# Patient Record
Sex: FEMALE | Race: WHITE | NOT HISPANIC OR LATINO | Employment: OTHER | URBAN - METROPOLITAN AREA
[De-identification: names, ages, dates, MRNs, and addresses within clinical notes are randomized per-mention and may not be internally consistent; named-entity substitution may affect disease eponyms.]

---

## 2018-02-10 ENCOUNTER — OFFICE VISIT (OUTPATIENT)
Dept: URGENT CARE | Facility: CLINIC | Age: 73
End: 2018-02-10
Payer: COMMERCIAL

## 2018-02-10 VITALS
DIASTOLIC BLOOD PRESSURE: 80 MMHG | RESPIRATION RATE: 18 BRPM | OXYGEN SATURATION: 95 % | HEART RATE: 61 BPM | SYSTOLIC BLOOD PRESSURE: 100 MMHG | TEMPERATURE: 97.1 F

## 2018-02-10 DIAGNOSIS — J40 BRONCHITIS: Primary | ICD-10-CM

## 2018-02-10 PROCEDURE — S9088 SERVICES PROVIDED IN URGENT: HCPCS | Performed by: FAMILY MEDICINE

## 2018-02-10 PROCEDURE — 99203 OFFICE O/P NEW LOW 30 MIN: CPT | Performed by: FAMILY MEDICINE

## 2018-02-10 RX ORDER — AZITHROMYCIN 250 MG/1
TABLET, FILM COATED ORAL
Qty: 6 TABLET | Refills: 0 | Status: SHIPPED | OUTPATIENT
Start: 2018-02-10 | End: 2018-02-14

## 2018-02-10 NOTE — PATIENT INSTRUCTIONS
Acute Bronchitis   WHAT YOU NEED TO KNOW:   Acute bronchitis is swelling and irritation in the air passages of your lungs  This irritation may cause you to cough or have other breathing problems  Acute bronchitis often starts because of another illness, such as a cold or the flu  The illness spreads from your nose and throat to your windpipe and airways  Bronchitis is often called a chest cold  Acute bronchitis lasts about 3 to 6 weeks and is usually not a serious illness  Your cough can last for several weeks  DISCHARGE INSTRUCTIONS:   Return to the emergency department if:   · You cough up blood  · Your lips or fingernails turn blue  · You feel like you are not getting enough air when you breathe  Contact your healthcare provider if:   · You have a fever  · Your breathing problems do not go away or get worse  · Your cough does not get better within 4 weeks  · You have questions or concerns about your condition or care  Self-care:   · Get more rest   Rest helps your body to heal  Slowly start to do more each day  Rest when you feel it is needed  · Avoid irritants in the air  Avoid chemicals, fumes, and dust  Wear a face mask if you must work around dust or fumes  Stay inside on days when air pollution levels are high  If you have allergies, stay inside when pollen counts are high  Do not use aerosol products, such as spray-on deodorant, bug spray, and hair spray  · Do not smoke or be around others who smoke  Nicotine and other chemicals in cigarettes and cigars damages the cilia that move mucus out of your lungs  Ask your healthcare provider for information if you currently smoke and need help to quit  E-cigarettes or smokeless tobacco still contain nicotine  Talk to your healthcare provider before you use these products  · Drink liquids as directed  Liquids help keep your air passages moist and help you cough up mucus   You may need to drink more liquids when you have acute bronchitis  Ask how much liquid to drink each day and which liquids are best for you  · Use a humidifier or vaporizer  Use a cool mist humidifier or a vaporizer to increase air moisture in your home  This may make it easier for you to breathe and help decrease your cough  Decrease risk for acute bronchitis:   · Get the vaccinations you need  Ask your healthcare provider if you should get vaccinated against the flu or pneumonia  · Prevent the spread of germs  You can decrease your risk of acute bronchitis and other illnesses by doing the following:     [facility] [facility] your hands often with soap and water  Carry germ-killing hand lotion or gel with you  You can use the lotion or gel to clean your hands when soap and water are not available  ¨ Do not touch your eyes, nose, or mouth unless you have washed your hands first     ¨ Always cover your mouth when you cough to prevent the spread of germs  It is best to cough into a tissue or your shirt sleeve instead of into your hand  Ask those around you cover their mouths when they cough  ¨ Try to avoid people who have a cold or the flu  If you are sick, stay away from others as much as possible  Medicines: Your healthcare provider may  give you any of the following:  · Ibuprofen or acetaminophen  are medicines that help lower your fever  They are available without a doctor's order  Ask your healthcare provider which medicine is right for you  Ask how much to take and how often to take it  Follow directions  These medicines can cause stomach bleeding if not taken correctly  Ibuprofen can cause kidney damage  Do not take ibuprofen if you have kidney disease, an ulcer, or allergies to aspirin  Acetaminophen can cause liver damage  Do not take more than 4,000 milligrams in 24 hours  · Decongestants  help loosen mucus in your lungs and make it easier to cough up  This can help you breathe easier  · Cough suppressants  decrease your urge to cough   If your cough produces mucus, do not take a cough suppressant unless your healthcare provider tells you to  Your healthcare provider may suggest that you take a cough suppressant at night so you can rest     · Inhalers  may be given  Your healthcare provider may give you one or more inhalers to help you breathe easier and cough less  An inhaler gives your medicine to open your airways  Ask your healthcare provider to show you how to use your inhaler correctly  · Take your medicine as directed  Contact your healthcare provider if you think your medicine is not helping or if you have side effects  Tell him of her if you are allergic to any medicine  Keep a list of the medicines, vitamins, and herbs you take  Include the amounts, and when and why you take them  Bring the list or the pill bottles to follow-up visits  Carry your medicine list with you in case of an emergency  Follow up with your healthcare provider as directed:  Write down questions you have so you will remember to ask them during your follow-up visits  © 2017 [employer] Information is for End User's use only and may not be sold, redistributed or otherwise used for commercial purposes  All illustrations and images included in CareNotes® are the copyrighted property of [employer]  or Lazaro Collier  The above information is an  only  It is not intended as medical advice for individual conditions or treatments  Talk to your doctor, nurse or pharmacist before following any medical regimen to see if it is safe and effective for you

## 2018-02-10 NOTE — PROGRESS NOTES
OFFICE VISIT  Brigitte Peguero 71 y o  female MRN: [id number]      Assessment / Plan:  Diagnoses and all orders for this visit:    Bronchitis  -     azithromycin (ZITHROMAX) 250 mg tablet; 2 pills today, then one daily for 4 more days          Bronchitis [J40]  1  Bronchitis  azithromycin (ZITHROMAX) 250 mg tablet     Discussion:  Patient was encouraged to keep a list of all of her ongoing medications in her purse and give a copy to her girlfriend who is with her tonight  She will also be taking azithromycin and Robitussin over-the-counter  She asked for something with codeine and however this was declined and explained to her that she would get dizzy or disoriented and fracture and additional hip  She is encouraged to see Dr Marley Hernandez in this coming week probably Monday or Tuesday for follow-up and reassessment of her O2 status  Reason For Visit / Chief Complaint  Chief Complaint   Patient presents with    Cough     with congestion     Wheezing    Fatigue        HPI:  Brigitte Peguero is a 71 y o  female patient presents with cough and fatigue for approximately 3 weeks  She states she has truly been coughing and wheezing for 3 weeks  She does not know any of her medications however her friend with her can glean some past medical history stating that she has had green productive sputum probably in the last 3-4 days without a temperature per se both ladies have been monitoring the her temperature initial blood pressure was 168/80 the patient in fact has had a MI 7 years ago which time she had a defibrillator due to heart failure she has done quite well since that time  She has a known history of hypertension sees Dr Luis Gonzalez as her primary care doctor  She claims she short of breath if she tries to do anything however she has had no leg edema or symptoms referable to congestive heart failure    She is allergic to go foods or medications    Historical Information   No past medical history on file  No past surgical history on file  Social History   History   Alcohol use Not on file     History   Drug use: Unknown     History   Smoking Status    Not on file   Smokeless Tobacco    Not on file     No family history on file  Meds/Allergies   No Known Allergies    Meds:    Current Outpatient Prescriptions:     azithromycin (ZITHROMAX) 250 mg tablet, 2 pills today, then one daily for 4 more days, Disp: 6 tablet, Rfl: 0      REVIEW OF SYSTEMS  Constitutional: negative  Eyes: negative  Ears, nose, mouth, throat, and face: negative  Respiratory: positive for cough  Cardiovascular: negative          Current Vitals:   Blood Pressure: 100/80 (02/10/18 1626)  Pulse: 61 (02/10/18 1626)  Temperature: (!) 97 1 °F (36 2 °C) (02/10/18 1626)  Respirations: 18 (02/10/18 1626)  SpO2: 95 % (02/10/18 1626)  /80   Pulse 61   Temp (!) 97 1 °F (36 2 °C)   Resp 18   SpO2 95%     PHYSICAL EXAM:          General Appearance:    Alert, cooperative, no apparent distress, appears stated age     Oriented x3    Head:    Normocephalic, without obvious abnormality, atraumatic   Eyes:      EOM's intact,      ZEESHAN,        conjunctiva/corneas clear,          fundi not visualized well   Ears:     Normal external ear canals     Tm right side  Normal     Tm left side    Normal     Nose:   Nares normal externally, septum midline,     mucosa normal, no drainage         Sinuses   Without   tenderness to palpation / percussion   Throat:   Lips, mucosa, and tongue normal       Anterior pharynx   normal      Posterior pharynx   normal   Neck:   Supple, symmetrical, trachea midline and moveable    Normal thyroid click present    No carotid bruits appreciated    Adenopathy in anterior cervical chain  normal    Adenopathy in posterior cervical chain   normal         Lungs:     Bilateral inspiratory and expiratory wheezes are noted at both bases    Rhonchi on inspiration is noted at the right base  Heart:    Regular rate and rhythm, S1 and S2 normal,     No S3, S4,     No murmurs, rubs                            Extremities:   Extremities normal,    atraumatic,     no cyanosis or edema         Skin:   Skin color, texture, turgor normal, no rashes or lesions                   Follow up at primary care in 2  days    Counseling / Coordination of Care  Total floor / unit time spent today 15 minutes  Greater than 50% of total time was spent with the patient and / or family counseling and / or coordination of care  Portions of the record may have been created with voice recognition software   Occasional wrong word or "sound a like" substitutions may have occurred due to the inherent limitations of voice recognition software   Read the chart carefully and recognize, using context, where substitutions have occurred

## 2018-04-25 LAB
ANION GAP SERPL CALCULATED.3IONS-SCNC: 8.1 MM/L
BASOPHILS # BLD AUTO: 0.1 X3/UL (ref 0–0.3)
BASOPHILS # BLD AUTO: 0.9 % (ref 0–2)
BUN SERPL-MCNC: 23 MG/DL (ref 7–25)
CALCIUM SERPL-MCNC: 10 MG/DL (ref 8.6–10.5)
CHLORIDE SERPL-SCNC: 105 MM/L (ref 98–107)
CO2 SERPL-SCNC: 33 MM/L (ref 21–31)
CREAT SERPL-MCNC: 0.77 MG/DL (ref 0.6–1.2)
DEPRECATED RDW RBC AUTO: 13.2 %
EGFR (HISTORICAL): > 60 GFR
EGFR AFRICAN AMERICAN (HISTORICAL): > 60 GFR
EOSINOPHIL # BLD AUTO: 0.1 X3/UL (ref 0–0.5)
EOSINOPHIL NFR BLD AUTO: 1.5 % (ref 0–5)
GLUCOSE (HISTORICAL): 99 MG/DL (ref 65–99)
HCT VFR BLD AUTO: 37.2 % (ref 37–47)
HGB BLD-MCNC: 12.6 G/DL (ref 12–16)
LYMPHOCYTES # BLD AUTO: 2.3 X3/UL (ref 1.2–4.2)
LYMPHOCYTES NFR BLD AUTO: 34.4 % (ref 20.5–51.1)
MCH RBC QN AUTO: 31.7 PG (ref 26–34)
MCHC RBC AUTO-ENTMCNC: 33.8 G/DL (ref 31–37)
MCV RBC AUTO: 93.7 FL (ref 81–99)
MONOCYTES # BLD AUTO: 0.8 X3/UL (ref 0–1)
MONOCYTES NFR BLD AUTO: 11.8 % (ref 1.7–12)
NEUTROPHILS # BLD AUTO: 3.5 X3/UL (ref 1.4–6.5)
NEUTS SEG NFR BLD AUTO: 51.4 % (ref 42.2–75.2)
OSMOLALITY, SERUM (HISTORICAL): 287 MOSM (ref 262–291)
PLATELET # BLD AUTO: 161 X3/UL (ref 130–400)
PMV BLD AUTO: 8.8 FL
POTASSIUM SERPL-SCNC: 4.1 MM/L (ref 3.5–5.5)
RBC # BLD AUTO: 3.97 X6/UL (ref 3.9–5.2)
SODIUM SERPL-SCNC: 142 MM/L (ref 134–143)
WBC # BLD AUTO: 6.7 X3/UL (ref 4.8–10.8)

## 2018-05-03 LAB — SURGICAL PATHOLOGY (HISTORICAL): NORMAL

## 2018-05-03 PROCEDURE — 88300 SURGICAL PATH GROSS: CPT | Performed by: PATHOLOGY

## 2018-05-04 ENCOUNTER — LAB REQUISITION (OUTPATIENT)
Dept: LAB | Facility: HOSPITAL | Age: 73
End: 2018-05-04
Payer: COMMERCIAL

## 2018-05-04 DIAGNOSIS — I42.9 CARDIOMYOPATHY (HCC): ICD-10-CM

## 2018-08-28 ENCOUNTER — TRANSCRIBE ORDERS (OUTPATIENT)
Dept: ADMINISTRATIVE | Facility: HOSPITAL | Age: 73
End: 2018-08-28

## 2018-08-28 ENCOUNTER — APPOINTMENT (OUTPATIENT)
Dept: LAB | Facility: HOSPITAL | Age: 73
End: 2018-08-28
Attending: INTERNAL MEDICINE
Payer: COMMERCIAL

## 2018-08-28 DIAGNOSIS — I10 HYPERTENSION, ESSENTIAL: ICD-10-CM

## 2018-08-28 DIAGNOSIS — M81.0 OSTEOPOROSIS, UNSPECIFIED OSTEOPOROSIS TYPE, UNSPECIFIED PATHOLOGICAL FRACTURE PRESENCE: ICD-10-CM

## 2018-08-28 DIAGNOSIS — I25.9 CHRONIC ISCHEMIC HEART DISEASE, UNSPECIFIED: ICD-10-CM

## 2018-08-28 DIAGNOSIS — I10 HYPERTENSION, ESSENTIAL: Primary | ICD-10-CM

## 2018-08-28 LAB
25(OH)D3 SERPL-MCNC: 33 NG/ML (ref 30–100)
ALBUMIN SERPL BCP-MCNC: 3.9 G/DL (ref 3.5–5.7)
ALP SERPL-CCNC: 116 U/L (ref 55–165)
ALT SERPL W P-5'-P-CCNC: 8 U/L (ref 7–52)
ANION GAP SERPL CALCULATED.3IONS-SCNC: 6 MMOL/L (ref 4–13)
AST SERPL W P-5'-P-CCNC: 18 U/L (ref 13–39)
BACTERIA UR QL AUTO: ABNORMAL /HPF
BASOPHILS # BLD AUTO: 0 THOUSANDS/ΜL (ref 0–0.1)
BASOPHILS NFR BLD AUTO: 1 % (ref 0–1)
BILIRUB SERPL-MCNC: 0.6 MG/DL (ref 0.2–1)
BILIRUB UR QL STRIP: NEGATIVE
BUN SERPL-MCNC: 29 MG/DL (ref 7–25)
CALCIUM SERPL-MCNC: 9.3 MG/DL (ref 8.6–10.5)
CHLORIDE SERPL-SCNC: 103 MMOL/L (ref 98–107)
CHOLEST SERPL-MCNC: 134 MG/DL (ref 0–200)
CLARITY UR: CLEAR
CO2 SERPL-SCNC: 29 MMOL/L (ref 21–31)
COLOR UR: YELLOW
CREAT SERPL-MCNC: 0.86 MG/DL (ref 0.6–1.2)
EOSINOPHIL # BLD AUTO: 0.1 THOUSAND/ΜL (ref 0–0.61)
EOSINOPHIL NFR BLD AUTO: 2 % (ref 0–6)
ERYTHROCYTE [DISTWIDTH] IN BLOOD BY AUTOMATED COUNT: 13.4 % (ref 11.6–15.1)
GFR SERPL CREATININE-BSD FRML MDRD: 68 ML/MIN/1.73SQ M
GLUCOSE P FAST SERPL-MCNC: 98 MG/DL (ref 65–99)
GLUCOSE UR STRIP-MCNC: NEGATIVE MG/DL
HCT VFR BLD AUTO: 36.6 % (ref 37–47)
HDLC SERPL-MCNC: 32 MG/DL (ref 40–60)
HGB BLD-MCNC: 12.2 G/DL (ref 11.5–15.4)
HGB UR QL STRIP.AUTO: NEGATIVE
KETONES UR STRIP-MCNC: NEGATIVE MG/DL
LDLC SERPL CALC-MCNC: 69 MG/DL (ref 0–100)
LEUKOCYTE ESTERASE UR QL STRIP: ABNORMAL
LYMPHOCYTES # BLD AUTO: 1.9 THOUSANDS/ΜL (ref 0.6–4.47)
LYMPHOCYTES NFR BLD AUTO: 35 % (ref 14–44)
MCH RBC QN AUTO: 31.3 PG (ref 26.8–34.3)
MCHC RBC AUTO-ENTMCNC: 33.5 G/DL (ref 31.4–37.4)
MCV RBC AUTO: 94 FL (ref 82–98)
MONOCYTES # BLD AUTO: 0.7 THOUSAND/ΜL (ref 0.17–1.22)
MONOCYTES NFR BLD AUTO: 14 % (ref 4–12)
NEUTROPHILS # BLD AUTO: 2.6 THOUSANDS/ΜL (ref 1.85–7.62)
NEUTS SEG NFR BLD AUTO: 49 % (ref 43–75)
NITRITE UR QL STRIP: NEGATIVE
NON-SQ EPI CELLS URNS QL MICRO: ABNORMAL /HPF
NONHDLC SERPL-MCNC: 102 MG/DL
NRBC BLD AUTO-RTO: 0 /100 WBCS
PH UR STRIP.AUTO: 7 [PH] (ref 5–8)
PLATELET # BLD AUTO: 183 THOUSANDS/UL (ref 149–390)
PMV BLD AUTO: 8.8 FL (ref 8.9–12.7)
POTASSIUM SERPL-SCNC: 4.5 MMOL/L (ref 3.5–5.5)
PROT SERPL-MCNC: 6.6 G/DL (ref 6.4–8.9)
PROT UR STRIP-MCNC: NEGATIVE MG/DL
RBC # BLD AUTO: 3.91 MILLION/UL (ref 3.81–5.12)
RBC #/AREA URNS AUTO: ABNORMAL /HPF
SODIUM SERPL-SCNC: 138 MMOL/L (ref 134–143)
SP GR UR STRIP.AUTO: 1.01 (ref 1–1.03)
TRIGL SERPL-MCNC: 166 MG/DL (ref 44–166)
UROBILINOGEN UR QL STRIP.AUTO: 0.2 E.U./DL
WBC # BLD AUTO: 5.3 THOUSAND/UL (ref 4.31–10.16)
WBC #/AREA URNS AUTO: ABNORMAL /HPF

## 2018-08-28 PROCEDURE — 81001 URINALYSIS AUTO W/SCOPE: CPT

## 2018-08-28 PROCEDURE — 85025 COMPLETE CBC W/AUTO DIFF WBC: CPT

## 2018-08-28 PROCEDURE — 82306 VITAMIN D 25 HYDROXY: CPT

## 2018-08-28 PROCEDURE — 80061 LIPID PANEL: CPT

## 2018-08-28 PROCEDURE — 80053 COMPREHEN METABOLIC PANEL: CPT

## 2018-08-28 PROCEDURE — 36415 COLL VENOUS BLD VENIPUNCTURE: CPT

## 2018-08-28 PROCEDURE — 81003 URINALYSIS AUTO W/O SCOPE: CPT

## 2018-09-01 PROBLEM — I44.7 LBBB (LEFT BUNDLE BRANCH BLOCK): Status: ACTIVE | Noted: 2018-09-01

## 2018-09-01 PROBLEM — I21.9 MI (MYOCARDIAL INFARCTION) (HCC): Status: ACTIVE | Noted: 2018-09-01

## 2018-09-01 PROBLEM — Z95.810 PRESENCE OF AUTOMATIC (IMPLANTABLE) CARDIAC DEFIBRILLATOR: Status: ACTIVE | Noted: 2018-09-01

## 2018-09-01 PROBLEM — I47.2 VENTRICULAR TACHYCARDIA (HCC): Status: ACTIVE | Noted: 2018-09-01

## 2018-09-01 PROBLEM — I50.9 CHF (CONGESTIVE HEART FAILURE) (HCC): Status: ACTIVE | Noted: 2018-09-01

## 2018-09-01 PROBLEM — E78.5 HYPERLIPIDEMIA: Status: ACTIVE | Noted: 2018-09-01

## 2018-09-01 PROBLEM — R01.1 CARDIAC MURMUR: Status: ACTIVE | Noted: 2018-09-01

## 2018-09-01 PROBLEM — I34.0 NONRHEUMATIC MITRAL VALVE REGURGITATION: Status: ACTIVE | Noted: 2018-09-01

## 2018-09-01 PROBLEM — E78.5 DYSLIPIDEMIA: Status: ACTIVE | Noted: 2018-09-01

## 2018-09-01 PROBLEM — I63.9 CVA (CEREBRAL VASCULAR ACCIDENT) (HCC): Status: ACTIVE | Noted: 2018-09-01

## 2018-09-01 PROBLEM — I25.10 CAD (CORONARY ARTERY DISEASE): Status: ACTIVE | Noted: 2018-09-01

## 2018-09-01 PROBLEM — I25.5 ISCHEMIC CARDIOMYOPATHY: Status: ACTIVE | Noted: 2018-09-01

## 2018-09-01 PROBLEM — I10 HYPERTENSION: Status: ACTIVE | Noted: 2018-09-01

## 2018-09-01 RX ORDER — ALENDRONATE SODIUM 10 MG/1
10 TABLET ORAL
COMMUNITY
End: 2019-09-16 | Stop reason: SDUPTHER

## 2018-09-01 RX ORDER — MULTIVITAMIN WITH IRON
2 TABLET ORAL DAILY
COMMUNITY

## 2018-09-01 RX ORDER — ATORVASTATIN CALCIUM 80 MG/1
80 TABLET, FILM COATED ORAL DAILY
COMMUNITY
End: 2019-11-26 | Stop reason: SDUPTHER

## 2018-09-01 RX ORDER — FLUOXETINE 20 MG/1
20 TABLET, FILM COATED ORAL DAILY
COMMUNITY
End: 2019-11-26 | Stop reason: SDUPTHER

## 2018-09-01 RX ORDER — FUROSEMIDE 20 MG/1
20 TABLET ORAL DAILY
COMMUNITY
End: 2020-04-18

## 2018-09-01 RX ORDER — NITROGLYCERIN 0.4 MG/1
0.4 TABLET SUBLINGUAL AS NEEDED
COMMUNITY
End: 2020-08-24 | Stop reason: SDUPTHER

## 2018-09-01 RX ORDER — ASPIRIN 81 MG/1
81 TABLET ORAL DAILY
COMMUNITY

## 2018-09-01 RX ORDER — LOSARTAN POTASSIUM 25 MG/1
25 TABLET ORAL DAILY
COMMUNITY
End: 2019-11-26 | Stop reason: SDUPTHER

## 2018-09-01 RX ORDER — CARVEDILOL 3.12 MG/1
3.12 TABLET ORAL 2 TIMES DAILY WITH MEALS
COMMUNITY
End: 2019-11-26 | Stop reason: SDUPTHER

## 2018-09-25 ENCOUNTER — REMOTE DEVICE CLINIC VISIT (OUTPATIENT)
Dept: CARDIOLOGY CLINIC | Facility: CLINIC | Age: 73
End: 2018-09-25
Payer: COMMERCIAL

## 2018-09-25 DIAGNOSIS — Z45.02 ENCOUNTER FOR CHECKING OF AUTOMATIC IMPLANTABLE CARDIOVERTER-DEFIBRILLATOR (AICD): ICD-10-CM

## 2018-09-25 DIAGNOSIS — I25.5 ISCHEMIC CARDIOMYOPATHY: Primary | ICD-10-CM

## 2018-09-25 PROCEDURE — 93295 DEV INTERROG REMOTE 1/2/MLT: CPT | Performed by: INTERNAL MEDICINE

## 2018-09-25 PROCEDURE — 93296 REM INTERROG EVL PM/IDS: CPT | Performed by: INTERNAL MEDICINE

## 2018-09-25 NOTE — PROGRESS NOTES
Lat[employer] remote BIV ICD 3 ATR (mode switching) events       Current Outpatient Prescriptions:     alendronate (FOSAMAX) 10 mg tablet, Take 10 mg by mouth Every day in the morning, at least 30 minutes before first food, beverage, or medication of the day , Disp: , Rfl:     aspirin (ECOTRIN LOW STRENGTH) 81 mg EC tablet, Take 81 mg by mouth daily, Disp: , Rfl:     atorvastatin (LIPITOR) 80 mg tablet, Take 80 mg by mouth daily, Disp: , Rfl:     carvedilol (COREG) 3 125 mg tablet, Take 3 125 mg by mouth 2 (two) times a day with meals, Disp: , Rfl:     FLUoxetine (PROzac) 20 MG tablet, Take 20 mg by mouth daily, Disp: , Rfl:     furosemide (LASIX) 20 mg tablet, Take 20 mg by mouth daily, Disp: , Rfl:     losartan (COZAAR) 25 mg tablet, Take 25 mg by mouth daily, Disp: , Rfl:     Magnesium 250 MG TABS, Take 2 tablets by mouth daily, Disp: , Rfl:     nitroglycerin (NITROSTAT) 0 4 mg SL tablet, Place 0 4 mg under the tongue as needed for chest pain, Disp: , Rfl:

## 2018-11-16 ENCOUNTER — OFFICE VISIT (OUTPATIENT)
Dept: CARDIOLOGY CLINIC | Facility: CLINIC | Age: 73
End: 2018-11-16
Payer: COMMERCIAL

## 2018-11-16 VITALS
SYSTOLIC BLOOD PRESSURE: 110 MMHG | HEIGHT: 61 IN | BODY MASS INDEX: 21.9 KG/M2 | DIASTOLIC BLOOD PRESSURE: 64 MMHG | WEIGHT: 116 LBS

## 2018-11-16 DIAGNOSIS — Z95.810 PRESENCE OF AUTOMATIC (IMPLANTABLE) CARDIAC DEFIBRILLATOR: ICD-10-CM

## 2018-11-16 DIAGNOSIS — E78.2 MIXED HYPERLIPIDEMIA: Primary | ICD-10-CM

## 2018-11-16 DIAGNOSIS — I10 ESSENTIAL HYPERTENSION: ICD-10-CM

## 2018-11-16 DIAGNOSIS — I25.10 CORONARY ARTERY DISEASE INVOLVING NATIVE CORONARY ARTERY OF NATIVE HEART WITHOUT ANGINA PECTORIS: ICD-10-CM

## 2018-11-16 PROBLEM — I42.0 DILATED CARDIOMYOPATHY (HCC): Status: ACTIVE | Noted: 2018-09-01

## 2018-11-16 PROCEDURE — 93000 ELECTROCARDIOGRAM COMPLETE: CPT | Performed by: INTERNAL MEDICINE

## 2018-11-16 PROCEDURE — 99214 OFFICE O/P EST MOD 30 MIN: CPT | Performed by: INTERNAL MEDICINE

## 2018-11-16 NOTE — ASSESSMENT & PLAN NOTE
2010 with cath  YAMILE to prox Cx  Left dominant  LAD OK  CAD hasn't been evaluated since then  Will check a Lexiscan study in 6 months

## 2018-11-16 NOTE — PROGRESS NOTES
Patient ID: Catrina Haji is a 68 y o  female  Plan:      Dilated cardiomyopathy (HCC)  Severe but stable symptoms  Though CAD was present, LV dysfunction seems out or proportion to this  CAD (coronary artery disease)  2010 with cath  YAMILE to prox Cx  Left dominant  LAD OK  CAD hasn't been evaluated since then  Will check a Lexiscan study in 6 months  Presence of automatic (implantable) cardiac defibrillator  Will continue surveillance  Follow up Plan:  6 month Lexiscan  1 year ecg and f/u visit  HPI:  The patient is seen in follow-up today regarding cardiomyopathy, prior biventricular ICD placement, CAD, and hyperlipidemia  No chest pain or chest pressure since her last visit  No shocks  No syncope or near syncope  Results for orders placed or performed in visit on 11/16/18   POCT ECG    Impression    Dual paced with proper capture  Other cardiac testing:  Last echo in July of 2017:  Mild LVH  Ejection fraction less than 20%  Past Surgical History:   Procedure Laterality Date    CARDIAC CATHETERIZATION  07/10/2017    High LVEDP  Severe biv failure 4+MR  YAMILE x2 to prox Cx  RCA non dominant  LAD ok   CARDIAC PACEMAKER PLACEMENT  2010    Dual chamber [employer] Biv AICD, gen change BiV ICD [employer] 5/18    CORONARY ANGIOPLASTY WITH STENT PLACEMENT  2010    stenting to circumflex     CMP:   Lab Results   Component Value Date     04/25/2018    K 4 5 08/28/2018    K 4 1 04/25/2018     08/28/2018     04/25/2018    CO2 29 08/28/2018    CO2 33 (H) 04/25/2018    BUN 29 (H) 08/28/2018    BUN 23 04/25/2018    CREATININE 0 86 08/28/2018    CREATININE 0 77 04/25/2018    EGFR 68 08/28/2018       Lipid Profile:   Lab Results   Component Value Date    TRIG 166 08/28/2018    HDL 32 (L) 08/28/2018         Review of Systems   10  point ROS  was otherwise non pertinent or negative except as per HPI or as below     Gait:  Slow but normal         Objective:     BP 110/64   Ht 5' 1" (1 549 m)   Wt 52 6 kg (116 lb)   BMI 21 92 kg/m²     PHYSICAL EXAM:    General:  Normal appearance in no distress  Eyes:  Anicteric  Oral mucosa:  Moist   Neck:  No JVD  Carotid upstrokes are brisk without bruits  No masses  Chest:  Clear to auscultation and percussion  Well-healed prior ICD implant site on the left  Cardiac:  Lateral PMI  Normal S1 and S2  No murmur gallop or rub  Abdomen:  Soft and nontender  No palpable organomegaly or aortic enlargement  Extremities:  No peripheral edema  Musculoskeletal:  Symmetric  Vascular:  Femoral pulses are brisk without bruits  Popliteal pulses are intact bilaterally  Pedal pulses are intact  Neuro:  Grossly symmetric  Psych:  Alert and oriented x3          Current Outpatient Prescriptions:     alendronate (FOSAMAX) 10 mg tablet, Take 10 mg by mouth Every day in the morning, at least 30 minutes before first food, beverage, or medication of the day , Disp: , Rfl:     aspirin (ECOTRIN LOW STRENGTH) 81 mg EC tablet, Take 81 mg by mouth daily, Disp: , Rfl:     atorvastatin (LIPITOR) 80 mg tablet, Take 80 mg by mouth daily, Disp: , Rfl:     carvedilol (COREG) 3 125 mg tablet, Take 3 125 mg by mouth 2 (two) times a day with meals, Disp: , Rfl:     FLUoxetine (PROzac) 20 MG tablet, Take 20 mg by mouth daily, Disp: , Rfl:     furosemide (LASIX) 20 mg tablet, Take 20 mg by mouth daily, Disp: , Rfl:     losartan (COZAAR) 25 mg tablet, Take 25 mg by mouth daily, Disp: , Rfl:     Magnesium 250 MG TABS, Take 2 tablets by mouth daily, Disp: , Rfl:     nitroglycerin (NITROSTAT) 0 4 mg SL tablet, Place 0 4 mg under the tongue as needed for chest pain, Disp: , Rfl:   Allergies   Allergen Reactions    Lisinopril      Past Medical History:   Diagnosis Date    Athscl heart disease of native coronary artery w/o ang pctrs     Cerebral infarction ([facility] [address] )     Essential (primary) hypertension     Hx of echocardiogram 07/31/2017    2D w/ CFD; EF0 15 (15%) mild LVH  Mild mitral and aortic regurgitation  Trace tricuspid regurgitation    / EF0 35 (35%) hypokinesis f anteroseptum, anterolateral wall, mid inferoseptum, and apex  Mildly dilated left atrium  Mild to moderate MR   Mild TR  7/11/17    Ischemic cardiomyopathy     Mixed hyperlipidemia     Nonrheumatic mitral (valve) insufficiency     Presence of automatic (implantable) cardiac defibrillator            History   Smoking Status    Former Smoker   Smokeless Tobacco    Never Used

## 2018-11-29 ENCOUNTER — APPOINTMENT (OUTPATIENT)
Dept: LAB | Facility: HOSPITAL | Age: 73
End: 2018-11-29
Attending: INTERNAL MEDICINE
Payer: COMMERCIAL

## 2018-11-29 ENCOUNTER — TRANSCRIBE ORDERS (OUTPATIENT)
Dept: ADMINISTRATIVE | Facility: HOSPITAL | Age: 73
End: 2018-11-29

## 2018-11-29 ENCOUNTER — HOSPITAL ENCOUNTER (OUTPATIENT)
Dept: RADIOLOGY | Facility: HOSPITAL | Age: 73
Discharge: HOME | End: 2018-11-29
Attending: INTERNAL MEDICINE
Payer: COMMERCIAL

## 2018-11-29 DIAGNOSIS — R05.9 COUGH: ICD-10-CM

## 2018-11-29 DIAGNOSIS — I25.9 CHRONIC ISCHEMIC HEART DISEASE, UNSPECIFIED: ICD-10-CM

## 2018-11-29 DIAGNOSIS — J20.9 ACUTE BRONCHITIS, UNSPECIFIED ORGANISM: ICD-10-CM

## 2018-11-29 DIAGNOSIS — J20.9 ACUTE BRONCHITIS, UNSPECIFIED ORGANISM: Primary | ICD-10-CM

## 2018-11-29 LAB
ALBUMIN SERPL BCP-MCNC: 3.9 G/DL (ref 3.5–5.7)
ALP SERPL-CCNC: 133 U/L (ref 55–165)
ALT SERPL W P-5'-P-CCNC: 9 U/L (ref 7–52)
ANION GAP SERPL CALCULATED.3IONS-SCNC: 8 MMOL/L (ref 4–13)
AST SERPL W P-5'-P-CCNC: 21 U/L (ref 13–39)
BACTERIA UR QL AUTO: ABNORMAL /HPF
BASOPHILS # BLD AUTO: 0.1 THOUSANDS/ΜL (ref 0–0.1)
BASOPHILS NFR BLD AUTO: 1 % (ref 0–1)
BILIRUB SERPL-MCNC: 0.5 MG/DL (ref 0.2–1)
BILIRUB UR QL STRIP: NEGATIVE
BNP SERPL-MCNC: 102 PG/ML (ref 1–100)
BUN SERPL-MCNC: 23 MG/DL (ref 7–25)
CALCIUM SERPL-MCNC: 9.4 MG/DL (ref 8.6–10.5)
CHLORIDE SERPL-SCNC: 103 MMOL/L (ref 98–107)
CLARITY UR: ABNORMAL
CO2 SERPL-SCNC: 28 MMOL/L (ref 21–31)
COLOR UR: YELLOW
CREAT SERPL-MCNC: 0.84 MG/DL (ref 0.6–1.2)
EOSINOPHIL # BLD AUTO: 0.1 THOUSAND/ΜL (ref 0–0.61)
EOSINOPHIL NFR BLD AUTO: 2 % (ref 0–6)
ERYTHROCYTE [DISTWIDTH] IN BLOOD BY AUTOMATED COUNT: 13.5 % (ref 11.6–15.1)
GFR SERPL CREATININE-BSD FRML MDRD: 70 ML/MIN/1.73SQ M
GLUCOSE SERPL-MCNC: 101 MG/DL (ref 65–140)
GLUCOSE UR STRIP-MCNC: NEGATIVE MG/DL
HCT VFR BLD AUTO: 38.9 % (ref 37–47)
HGB BLD-MCNC: 12.6 G/DL (ref 11.5–15.4)
HGB UR QL STRIP.AUTO: NEGATIVE
KETONES UR STRIP-MCNC: NEGATIVE MG/DL
LEUKOCYTE ESTERASE UR QL STRIP: ABNORMAL
LYMPHOCYTES # BLD AUTO: 2.1 THOUSANDS/ΜL (ref 0.6–4.47)
LYMPHOCYTES NFR BLD AUTO: 37 % (ref 14–44)
MCH RBC QN AUTO: 31 PG (ref 26.8–34.3)
MCHC RBC AUTO-ENTMCNC: 32.5 G/DL (ref 31.4–37.4)
MCV RBC AUTO: 95 FL (ref 82–98)
MONOCYTES # BLD AUTO: 0.6 THOUSAND/ΜL (ref 0.17–1.22)
MONOCYTES NFR BLD AUTO: 11 % (ref 4–12)
MUCOUS THREADS UR QL AUTO: ABNORMAL
NEUTROPHILS # BLD AUTO: 2.9 THOUSANDS/ΜL (ref 1.85–7.62)
NEUTS SEG NFR BLD AUTO: 49 % (ref 43–75)
NITRITE UR QL STRIP: NEGATIVE
NON-SQ EPI CELLS URNS QL MICRO: ABNORMAL /HPF
NRBC BLD AUTO-RTO: 0 /100 WBCS
OTHER STN SPEC: ABNORMAL
PH UR STRIP.AUTO: 6 [PH] (ref 5–8)
PLATELET # BLD AUTO: 169 THOUSANDS/UL (ref 149–390)
PMV BLD AUTO: 8.5 FL (ref 8.9–12.7)
POTASSIUM SERPL-SCNC: 4.6 MMOL/L (ref 3.5–5.5)
PROT SERPL-MCNC: 6.6 G/DL (ref 6.4–8.9)
PROT UR STRIP-MCNC: NEGATIVE MG/DL
RBC # BLD AUTO: 4.08 MILLION/UL (ref 3.81–5.12)
RBC #/AREA URNS AUTO: ABNORMAL /HPF
SODIUM SERPL-SCNC: 139 MMOL/L (ref 134–143)
SP GR UR STRIP.AUTO: 1.02 (ref 1–1.03)
UROBILINOGEN UR QL STRIP.AUTO: 0.2 E.U./DL
WBC # BLD AUTO: 5.8 THOUSAND/UL (ref 4.31–10.16)
WBC #/AREA URNS AUTO: ABNORMAL /HPF

## 2018-11-29 PROCEDURE — 71046 X-RAY EXAM CHEST 2 VIEWS: CPT

## 2018-11-29 PROCEDURE — 36415 COLL VENOUS BLD VENIPUNCTURE: CPT

## 2018-11-29 PROCEDURE — 80053 COMPREHEN METABOLIC PANEL: CPT

## 2018-11-29 PROCEDURE — 85025 COMPLETE CBC W/AUTO DIFF WBC: CPT

## 2018-11-29 PROCEDURE — 83880 ASSAY OF NATRIURETIC PEPTIDE: CPT

## 2018-11-29 PROCEDURE — 81001 URINALYSIS AUTO W/SCOPE: CPT | Performed by: INTERNAL MEDICINE

## 2018-12-31 ENCOUNTER — REMOTE DEVICE CLINIC VISIT (OUTPATIENT)
Dept: CARDIOLOGY CLINIC | Facility: CLINIC | Age: 73
End: 2018-12-31
Payer: COMMERCIAL

## 2018-12-31 DIAGNOSIS — Z45.02 ENCOUNTER FOR IMPLANTABLE DEFIBRILLATOR REPROGRAMMING OR CHECK: ICD-10-CM

## 2018-12-31 DIAGNOSIS — I25.5 ISCHEMIC CARDIOMYOPATHY: Primary | ICD-10-CM

## 2018-12-31 PROCEDURE — 93296 REM INTERROG EVL PM/IDS: CPT | Performed by: INTERNAL MEDICINE

## 2018-12-31 PROCEDURE — 93295 DEV INTERROG REMOTE 1/2/MLT: CPT | Performed by: INTERNAL MEDICINE

## 2018-12-31 NOTE — PROGRESS NOTES
Latitude remote biv-icd 2 ATR and 5 PMT dected  Normal battery function      Current Outpatient Prescriptions:     alendronate (FOSAMAX) 10 mg tablet, Take 10 mg by mouth Every day in the morning, at least 30 minutes before first food, beverage, or medication of the day , Disp: , Rfl:     aspirin (ECOTRIN LOW STRENGTH) 81 mg EC tablet, Take 81 mg by mouth daily, Disp: , Rfl:     atorvastatin (LIPITOR) 80 mg tablet, Take 80 mg by mouth daily, Disp: , Rfl:     carvedilol (COREG) 3 125 mg tablet, Take 3 125 mg by mouth 2 (two) times a day with meals, Disp: , Rfl:     FLUoxetine (PROzac) 20 MG tablet, Take 20 mg by mouth daily, Disp: , Rfl:     furosemide (LASIX) 20 mg tablet, Take 20 mg by mouth daily, Disp: , Rfl:     losartan (COZAAR) 25 mg tablet, Take 25 mg by mouth daily, Disp: , Rfl:     Magnesium 250 MG TABS, Take 2 tablets by mouth daily, Disp: , Rfl:     nitroglycerin (NITROSTAT) 0 4 mg SL tablet, Place 0 4 mg under the tongue as needed for chest pain, Disp: , Rfl:

## 2019-04-01 ENCOUNTER — REMOTE DEVICE CLINIC VISIT (OUTPATIENT)
Dept: CARDIOLOGY CLINIC | Facility: CLINIC | Age: 74
End: 2019-04-01
Payer: COMMERCIAL

## 2019-04-01 DIAGNOSIS — I42.0 DILATED CARDIOMYOPATHY (HCC): Primary | ICD-10-CM

## 2019-04-01 DIAGNOSIS — Z45.02 ENCOUNTER FOR IMPLANTABLE DEFIBRILLATOR REPROGRAMMING OR CHECK: ICD-10-CM

## 2019-04-01 PROCEDURE — 93296 REM INTERROG EVL PM/IDS: CPT | Performed by: INTERNAL MEDICINE

## 2019-04-01 PROCEDURE — 93295 DEV INTERROG REMOTE 1/2/MLT: CPT | Performed by: INTERNAL MEDICINE

## 2019-05-21 ENCOUNTER — HOSPITAL ENCOUNTER (OUTPATIENT)
Dept: RADIOLOGY | Facility: HOSPITAL | Age: 74
Discharge: HOME | End: 2019-05-21
Attending: INTERNAL MEDICINE
Payer: COMMERCIAL

## 2019-05-21 ENCOUNTER — TRANSCRIBE ORDERS (OUTPATIENT)
Dept: ADMINISTRATIVE | Facility: HOSPITAL | Age: 74
End: 2019-05-21

## 2019-05-21 DIAGNOSIS — M54.5 LOW BACK PAIN, UNSPECIFIED BACK PAIN LATERALITY, UNSPECIFIED CHRONICITY, WITH SCIATICA PRESENCE UNSPECIFIED: ICD-10-CM

## 2019-05-21 DIAGNOSIS — M25.552 LEFT HIP PAIN: ICD-10-CM

## 2019-05-21 DIAGNOSIS — M25.552 LEFT HIP PAIN: Primary | ICD-10-CM

## 2019-05-21 PROCEDURE — 72110 X-RAY EXAM L-2 SPINE 4/>VWS: CPT

## 2019-05-21 PROCEDURE — 73502 X-RAY EXAM HIP UNI 2-3 VIEWS: CPT

## 2019-05-23 ENCOUNTER — TELEPHONE (OUTPATIENT)
Dept: CARDIOLOGY CLINIC | Facility: CLINIC | Age: 74
End: 2019-05-23

## 2019-06-18 ENCOUNTER — APPOINTMENT (OUTPATIENT)
Dept: LAB | Facility: HOSPITAL | Age: 74
End: 2019-06-18
Attending: INTERNAL MEDICINE
Payer: COMMERCIAL

## 2019-06-18 ENCOUNTER — TRANSCRIBE ORDERS (OUTPATIENT)
Dept: ADMINISTRATIVE | Facility: HOSPITAL | Age: 74
End: 2019-06-18

## 2019-06-18 DIAGNOSIS — I10 ESSENTIAL HYPERTENSION: ICD-10-CM

## 2019-06-18 DIAGNOSIS — I25.9 CHRONIC ISCHEMIC HEART DISEASE, UNSPECIFIED: ICD-10-CM

## 2019-06-18 DIAGNOSIS — M81.0 SENILE OSTEOPOROSIS: ICD-10-CM

## 2019-06-18 DIAGNOSIS — I10 ESSENTIAL HYPERTENSION: Primary | ICD-10-CM

## 2019-06-18 LAB
25(OH)D3 SERPL-MCNC: 37.7 NG/ML (ref 30–100)
ALBUMIN SERPL BCP-MCNC: 4 G/DL (ref 3.5–5.7)
ALP SERPL-CCNC: 119 U/L (ref 55–165)
ALT SERPL W P-5'-P-CCNC: 10 U/L (ref 7–52)
ANION GAP SERPL CALCULATED.3IONS-SCNC: 8 MMOL/L (ref 4–13)
AST SERPL W P-5'-P-CCNC: 19 U/L (ref 13–39)
BACTERIA UR QL AUTO: ABNORMAL /HPF
BILIRUB SERPL-MCNC: 0.7 MG/DL (ref 0.2–1)
BILIRUB UR QL STRIP: NEGATIVE
BUN SERPL-MCNC: 23 MG/DL (ref 7–25)
CALCIUM SERPL-MCNC: 9.9 MG/DL (ref 8.6–10.5)
CHLORIDE SERPL-SCNC: 104 MMOL/L (ref 98–107)
CHOLEST SERPL-MCNC: 148 MG/DL (ref 0–200)
CLARITY UR: CLEAR
CO2 SERPL-SCNC: 27 MMOL/L (ref 21–31)
COLOR UR: YELLOW
CREAT SERPL-MCNC: 0.83 MG/DL (ref 0.6–1.2)
CREAT UR-MCNC: 105 MG/DL
ERYTHROCYTE [DISTWIDTH] IN BLOOD BY AUTOMATED COUNT: 13.7 % (ref 11.6–15.1)
GFR SERPL CREATININE-BSD FRML MDRD: 70 ML/MIN/1.73SQ M
GLUCOSE SERPL-MCNC: 92 MG/DL (ref 65–140)
GLUCOSE UR STRIP-MCNC: NEGATIVE MG/DL
HCT VFR BLD AUTO: 38 % (ref 37–47)
HDLC SERPL-MCNC: 40 MG/DL (ref 40–60)
HGB BLD-MCNC: 12.9 G/DL (ref 11.5–15.4)
HGB UR QL STRIP.AUTO: NEGATIVE
KETONES UR STRIP-MCNC: NEGATIVE MG/DL
LDLC SERPL CALC-MCNC: 87 MG/DL (ref 0–100)
LEUKOCYTE ESTERASE UR QL STRIP: NEGATIVE
MCH RBC QN AUTO: 31.5 PG (ref 26.8–34.3)
MCHC RBC AUTO-ENTMCNC: 33.9 G/DL (ref 31.4–37.4)
MCV RBC AUTO: 93 FL (ref 82–98)
MICROALBUMIN UR-MCNC: 11 MG/L (ref 0–20)
MICROALBUMIN/CREAT 24H UR: 10 MG/G CREATININE (ref 0–30)
NITRITE UR QL STRIP: NEGATIVE
NON-SQ EPI CELLS URNS QL MICRO: ABNORMAL /HPF
NONHDLC SERPL-MCNC: 108 MG/DL
PH UR STRIP.AUTO: 6 [PH]
PLATELET # BLD AUTO: 153 THOUSANDS/UL (ref 149–390)
PMV BLD AUTO: 9 FL (ref 8.9–12.7)
POTASSIUM SERPL-SCNC: 4.7 MMOL/L (ref 3.5–5.5)
PROT SERPL-MCNC: 6.6 G/DL (ref 6.4–8.9)
PROT UR STRIP-MCNC: NEGATIVE MG/DL
RBC # BLD AUTO: 4.1 MILLION/UL (ref 3.81–5.12)
RBC #/AREA URNS AUTO: ABNORMAL /HPF
SODIUM SERPL-SCNC: 139 MMOL/L (ref 134–143)
SP GR UR STRIP.AUTO: 1.02 (ref 1–1.03)
TRIGL SERPL-MCNC: 106 MG/DL (ref 44–166)
UROBILINOGEN UR QL STRIP.AUTO: 0.2 E.U./DL
WBC # BLD AUTO: 6 THOUSAND/UL (ref 4.31–10.16)
WBC #/AREA URNS AUTO: ABNORMAL /HPF

## 2019-06-18 PROCEDURE — 82043 UR ALBUMIN QUANTITATIVE: CPT | Performed by: INTERNAL MEDICINE

## 2019-06-18 PROCEDURE — 80053 COMPREHEN METABOLIC PANEL: CPT

## 2019-06-18 PROCEDURE — 81001 URINALYSIS AUTO W/SCOPE: CPT | Performed by: INTERNAL MEDICINE

## 2019-06-18 PROCEDURE — 82570 ASSAY OF URINE CREATININE: CPT | Performed by: INTERNAL MEDICINE

## 2019-06-18 PROCEDURE — 85027 COMPLETE CBC AUTOMATED: CPT

## 2019-06-18 PROCEDURE — 36415 COLL VENOUS BLD VENIPUNCTURE: CPT

## 2019-06-18 PROCEDURE — 80061 LIPID PANEL: CPT

## 2019-06-18 PROCEDURE — 82306 VITAMIN D 25 HYDROXY: CPT

## 2019-07-26 ENCOUNTER — IN-CLINIC DEVICE VISIT (OUTPATIENT)
Dept: CARDIOLOGY CLINIC | Facility: CLINIC | Age: 74
End: 2019-07-26
Payer: COMMERCIAL

## 2019-07-26 DIAGNOSIS — Z45.02 ENCOUNTER FOR IMPLANTABLE DEFIBRILLATOR REPROGRAMMING OR CHECK: ICD-10-CM

## 2019-07-26 DIAGNOSIS — I47.2 VENTRICULAR TACHYCARDIA (HCC): Primary | ICD-10-CM

## 2019-07-26 DIAGNOSIS — I42.0 DILATED CARDIOMYOPATHY (HCC): ICD-10-CM

## 2019-07-26 PROCEDURE — 93284 PRGRMG EVAL IMPLANTABLE DFB: CPT | Performed by: INTERNAL MEDICINE

## 2019-07-26 NOTE — PROGRESS NOTES
device check BIV ICD  Pacing @ 98 %  12 brief mode switches for PAT testing stable  Normal battery function        Current Outpatient Medications:     alendronate (FOSAMAX) 10 mg tablet, Take 10 mg by mouth Every day in the morning, at least 30 minutes before first food, beverage, or medication of the day , Disp: , Rfl:     aspirin (ECOTRIN LOW STRENGTH) 81 mg EC tablet, Take 81 mg by mouth daily, Disp: , Rfl:     atorvastatin (LIPITOR) 80 mg tablet, Take 80 mg by mouth daily, Disp: , Rfl:     carvedilol (COREG) 3 125 mg tablet, Take 3 125 mg by mouth 2 (two) times a day with meals, Disp: , Rfl:     FLUoxetine (PROzac) 20 MG tablet, Take 20 mg by mouth daily, Disp: , Rfl:     furosemide (LASIX) 20 mg tablet, Take 20 mg by mouth daily, Disp: , Rfl:     losartan (COZAAR) 25 mg tablet, Take 25 mg by mouth daily, Disp: , Rfl:     Magnesium 250 MG TABS, Take 2 tablets by mouth daily, Disp: , Rfl:     nitroglycerin (NITROSTAT) 0 4 mg SL tablet, Place 0 4 mg under the tongue as needed for chest pain, Disp: , Rfl:

## 2019-09-16 ENCOUNTER — TELEPHONE (OUTPATIENT)
Dept: NEPHROLOGY | Facility: CLINIC | Age: 74
End: 2019-09-16

## 2019-09-16 ENCOUNTER — TRANSCRIBE ORDERS (OUTPATIENT)
Dept: ADMINISTRATIVE | Facility: HOSPITAL | Age: 74
End: 2019-09-16

## 2019-09-16 DIAGNOSIS — M81.0 AGE-RELATED OSTEOPOROSIS WITHOUT CURRENT PATHOLOGICAL FRACTURE: Primary | ICD-10-CM

## 2019-09-16 DIAGNOSIS — M80.00XA OSTEOPOROSIS WITH CURRENT PATHOLOGICAL FRACTURE, UNSPECIFIED OSTEOPOROSIS TYPE, INITIAL ENCOUNTER: Primary | ICD-10-CM

## 2019-09-16 RX ORDER — ALENDRONATE SODIUM 70 MG/1
70 TABLET ORAL
Qty: 4 TABLET | Refills: 5 | Status: SHIPPED | OUTPATIENT
Start: 2019-09-16 | End: 2020-03-02

## 2019-09-16 NOTE — TELEPHONE ENCOUNTER
Patient left a message she needs a refill on     Alendronate 70mg once weekly        Please send to [facility]

## 2019-10-01 ENCOUNTER — TELEPHONE (OUTPATIENT)
Dept: NEPHROLOGY | Facility: CLINIC | Age: 74
End: 2019-10-01

## 2019-10-01 ENCOUNTER — HOSPITAL ENCOUNTER (OUTPATIENT)
Dept: BONE DENSITY | Facility: HOSPITAL | Age: 74
Discharge: HOME | End: 2019-10-01
Attending: INTERNAL MEDICINE
Payer: COMMERCIAL

## 2019-10-01 DIAGNOSIS — M80.00XA OSTEOPOROSIS WITH CURRENT PATHOLOGICAL FRACTURE, UNSPECIFIED OSTEOPOROSIS TYPE, INITIAL ENCOUNTER: ICD-10-CM

## 2019-10-01 PROCEDURE — 77080 DXA BONE DENSITY AXIAL: CPT

## 2019-10-01 NOTE — TELEPHONE ENCOUNTER
----- Message from Guido Romo MD sent at 10/1/2019  3:17 PM EDT -----  Has ostoporosis  She can take Fosamax once a week with citrical D or Prolia shot every 6 months

## 2019-10-08 ENCOUNTER — TELEPHONE (OUTPATIENT)
Dept: NEPHROLOGY | Facility: CLINIC | Age: 74
End: 2019-10-08

## 2019-10-15 ENCOUNTER — TELEPHONE (OUTPATIENT)
Dept: NEPHROLOGY | Facility: CLINIC | Age: 74
End: 2019-10-15

## 2019-10-18 NOTE — TELEPHONE ENCOUNTER
Left message with results and options of medications that she can take and told her to please call us back that we tried to reach her multiple times

## 2019-10-29 ENCOUNTER — REMOTE DEVICE CLINIC VISIT (OUTPATIENT)
Dept: CARDIOLOGY CLINIC | Facility: CLINIC | Age: 74
End: 2019-10-29
Payer: COMMERCIAL

## 2019-10-29 DIAGNOSIS — I47.2 VENTRICULAR TACHYCARDIA (HCC): ICD-10-CM

## 2019-10-29 DIAGNOSIS — I42.0 DILATED CARDIOMYOPATHY (HCC): Primary | ICD-10-CM

## 2019-10-29 DIAGNOSIS — Z45.02 ENCOUNTER FOR IMPLANTABLE DEFIBRILLATOR REPROGRAMMING OR CHECK: ICD-10-CM

## 2019-10-29 PROCEDURE — 93296 REM INTERROG EVL PM/IDS: CPT | Performed by: INTERNAL MEDICINE

## 2019-10-29 PROCEDURE — 93295 DEV INTERROG REMOTE 1/2/MLT: CPT | Performed by: INTERNAL MEDICINE

## 2019-10-29 NOTE — PROGRESS NOTES
Latitude remote biv icd  1 mode switch 4 seconds  5 PMT noted  Normal battery function        Current Outpatient Medications:     alendronate (FOSAMAX) 70 mg tablet, Take 1 tablet (70 mg total) by mouth weekly before breakfast Take 30 minutes before first food, beverage, or medication of the day , Disp: 4 tablet, Rfl: 5    aspirin (ECOTRIN LOW STRENGTH) 81 mg EC tablet, Take 81 mg by mouth daily, Disp: , Rfl:     atorvastatin (LIPITOR) 80 mg tablet, Take 80 mg by mouth daily, Disp: , Rfl:     carvedilol (COREG) 3 125 mg tablet, Take 3 125 mg by mouth 2 (two) times a day with meals, Disp: , Rfl:     FLUoxetine (PROzac) 20 MG tablet, Take 20 mg by mouth daily, Disp: , Rfl:     furosemide (LASIX) 20 mg tablet, Take 20 mg by mouth daily, Disp: , Rfl:     losartan (COZAAR) 25 mg tablet, Take 25 mg by mouth daily, Disp: , Rfl:     Magnesium 250 MG TABS, Take 2 tablets by mouth daily, Disp: , Rfl:     nitroglycerin (NITROSTAT) 0 4 mg SL tablet, Place 0 4 mg under the tongue as needed for chest pain, Disp: , Rfl:

## 2019-11-26 DIAGNOSIS — I10 ESSENTIAL HYPERTENSION: ICD-10-CM

## 2019-11-26 DIAGNOSIS — F32.A DEPRESSION, UNSPECIFIED DEPRESSION TYPE: ICD-10-CM

## 2019-11-26 DIAGNOSIS — E78.2 MIXED HYPERLIPIDEMIA: Primary | ICD-10-CM

## 2019-11-27 RX ORDER — CARVEDILOL 3.12 MG/1
3.12 TABLET ORAL 2 TIMES DAILY WITH MEALS
Qty: 60 TABLET | Refills: 5 | Status: SHIPPED | OUTPATIENT
Start: 2019-11-27 | End: 2020-05-06 | Stop reason: SDUPTHER

## 2019-11-27 RX ORDER — LOSARTAN POTASSIUM 25 MG/1
25 TABLET ORAL DAILY
Qty: 30 TABLET | Refills: 5 | Status: SHIPPED | OUTPATIENT
Start: 2019-11-27 | End: 2020-01-06 | Stop reason: SDUPTHER

## 2019-11-27 RX ORDER — ATORVASTATIN CALCIUM 80 MG/1
80 TABLET, FILM COATED ORAL DAILY
Qty: 30 TABLET | Refills: 5 | Status: SHIPPED | OUTPATIENT
Start: 2019-11-27 | End: 2020-05-06 | Stop reason: SDUPTHER

## 2019-11-27 RX ORDER — FLUOXETINE 20 MG/1
20 TABLET, FILM COATED ORAL DAILY
Qty: 30 TABLET | Refills: 5 | Status: SHIPPED | OUTPATIENT
Start: 2019-11-27 | End: 2020-05-05

## 2020-01-02 ENCOUNTER — HOSPITAL ENCOUNTER (OUTPATIENT)
Dept: NON INVASIVE DIAGNOSTICS | Facility: HOSPITAL | Age: 75
Discharge: HOME | End: 2020-01-02
Attending: INTERNAL MEDICINE
Payer: COMMERCIAL

## 2020-01-02 ENCOUNTER — HOSPITAL ENCOUNTER (OUTPATIENT)
Dept: NUCLEAR MEDICINE | Facility: HOSPITAL | Age: 75
Discharge: HOME | End: 2020-01-02
Attending: INTERNAL MEDICINE

## 2020-01-02 ENCOUNTER — HOSPITAL ENCOUNTER (OUTPATIENT)
Dept: NUCLEAR MEDICINE | Facility: HOSPITAL | Age: 75
Discharge: HOME | End: 2020-01-02
Attending: INTERNAL MEDICINE
Payer: COMMERCIAL

## 2020-01-02 DIAGNOSIS — Z95.810 PRESENCE OF AUTOMATIC (IMPLANTABLE) CARDIAC DEFIBRILLATOR: ICD-10-CM

## 2020-01-02 DIAGNOSIS — I25.10 CORONARY ARTERY DISEASE INVOLVING NATIVE CORONARY ARTERY OF NATIVE HEART WITHOUT ANGINA PECTORIS: ICD-10-CM

## 2020-01-02 PROCEDURE — 78452 HT MUSCLE IMAGE SPECT MULT: CPT

## 2020-01-02 PROCEDURE — A9502 TC99M TETROFOSMIN: HCPCS

## 2020-01-02 PROCEDURE — 93017 CV STRESS TEST TRACING ONLY: CPT

## 2020-01-02 RX ADMIN — REGADENOSON 0.4 MG: 0.08 INJECTION, SOLUTION INTRAVENOUS at 09:10

## 2020-01-03 LAB
ARRHY DURING EX: NORMAL
CHEST PAIN STATEMENT: NORMAL
MAX DIASTOLIC BP: 72 MMHG
MAX HEART RATE: 100 BPM
MAX PREDICTED HEART RATE: 147 BPM
MAX. SYSTOLIC BP: 138 MMHG
PROTOCOL NAME: NORMAL
REASON FOR TERMINATION: NORMAL
TARGET HR FORMULA: NORMAL
TEST INDICATION: NORMAL
TIME IN EXERCISE PHASE: NORMAL

## 2020-01-03 PROCEDURE — 78452 HT MUSCLE IMAGE SPECT MULT: CPT | Performed by: INTERNAL MEDICINE

## 2020-01-03 PROCEDURE — 93018 CV STRESS TEST I&R ONLY: CPT | Performed by: INTERNAL MEDICINE

## 2020-01-03 PROCEDURE — 93016 CV STRESS TEST SUPVJ ONLY: CPT | Performed by: INTERNAL MEDICINE

## 2020-01-06 ENCOUNTER — OFFICE VISIT (OUTPATIENT)
Dept: CARDIOLOGY CLINIC | Facility: CLINIC | Age: 75
End: 2020-01-06
Payer: COMMERCIAL

## 2020-01-06 VITALS
HEIGHT: 61 IN | DIASTOLIC BLOOD PRESSURE: 82 MMHG | HEART RATE: 52 BPM | WEIGHT: 114 LBS | SYSTOLIC BLOOD PRESSURE: 154 MMHG | BODY MASS INDEX: 21.52 KG/M2

## 2020-01-06 DIAGNOSIS — I42.0 DILATED CARDIOMYOPATHY (HCC): ICD-10-CM

## 2020-01-06 DIAGNOSIS — I10 ESSENTIAL HYPERTENSION: ICD-10-CM

## 2020-01-06 DIAGNOSIS — I25.10 CORONARY ARTERY DISEASE INVOLVING NATIVE CORONARY ARTERY OF NATIVE HEART WITHOUT ANGINA PECTORIS: Primary | ICD-10-CM

## 2020-01-06 PROCEDURE — 99214 OFFICE O/P EST MOD 30 MIN: CPT | Performed by: INTERNAL MEDICINE

## 2020-01-06 RX ORDER — LOSARTAN POTASSIUM 50 MG/1
50 TABLET ORAL DAILY
Qty: 90 TABLET | Refills: 5 | Status: SHIPPED | OUTPATIENT
Start: 2020-01-06 | End: 2021-02-04 | Stop reason: SDUPTHER

## 2020-01-06 NOTE — PROGRESS NOTES
Patient ID: Gissell Pickens is a 69 y o  female  Plan:      Dilated cardiomyopathy ([address] )  Severe but recent nuke seemed c/w prior MI  Will reassess by echo  CAD (coronary artery disease)  Prior YAMILE to Cx  Hypertension  Inadequately controlled  Will increase the losartan dose and re eval in 6 weeks  Follow up Plan:6 week f/u with echo prior  HPI:   The patient is seen in f/u regarding dilated CM, CAD and device surveillance  to reiterate, in 2010 she underwent drug-eluting stent to the proximal  Circumflex artery  This was a left dominant circulation  She was thought to have cardiomyopathy out of proportion to CAD however  She has had a biventricular ICD placed in 2010  I believe this was replaced in 2018  she has had no recent shocks  No recent change in exertional capacity  No chest pain or chest pressure  The main issue for her recently is that her son has been able to   Obtain custody of his stepdaughter  Most recent or relevant cardiac/vascular testing:    Juan Comment 1/2/2020: EF 33%  No ischemia  Prior anteroapical MI   2010: YAMILE to proximal dominant circumflex artery  2018:  Replacement of biiventricular [employer] ICD  Originally placed 2010  Past Surgical History:   Procedure Laterality Date    CARDIAC CATHETERIZATION  07/10/2017    High LVEDP  Severe biv failure 4+MR  YAMILE x2 to prox Cx  RCA non dominant  LAD ok      CARDIAC PACEMAKER PLACEMENT  2010    Dual chamber Guidant Biv AICD, gen change BiV ICD [place] [facility] 5/18    CORONARY ANGIOPLASTY WITH STENT PLACEMENT  2010    stenting to circumflex     CMP:   Lab Results   Component Value Date     04/25/2018    K 4 7 06/18/2019    K 4 1 04/25/2018     06/18/2019     04/25/2018    CO2 27 06/18/2019    CO2 33 (H) 04/25/2018    BUN 23 06/18/2019    BUN 23 04/25/2018    CREATININE 0 83 06/18/2019    CREATININE 0 77 04/25/2018    EGFR 70 06/18/2019       Lipid Profile:   Lab Results Component Value Date    TRIG 106 06/18/2019    HDL 40 06/18/2019         Review of Systems   10  point ROS  was otherwise non pertinent or negative except as per HPI or as below  Gait:  Slow but normal         Objective:     /82   Pulse (!) 52   Ht 5' 1" (1 549 m)   Wt 51 7 kg (114 lb)   BMI 21 54 kg/m²     PHYSICAL EXAM:    General:  Normal appearance in no distress  Eyes:  Anicteric  Oral mucosa:  Moist   Neck:  No JVD  Carotid upstrokes are brisk without bruits  No masses  Chest:  Clear to auscultation and percussion  ICD left subclavian  Cardiac:  No  palpable PMI  Normal S1 and S2  No murmur gallop or rub  Abdomen:  Soft and nontender  No palpable organomegaly or aortic enlargement  Extremities:  No peripheral edema  Musculoskeletal:  Symmetric  Vascular:  Femoral pulses are brisk without bruits  Popliteal pulses are intact bilaterally  Pedal pulses are absent  Neuro:  Grossly symmetric  Psych:  Alert and oriented x3          Current Outpatient Medications:     alendronate (FOSAMAX) 70 mg tablet, Take 1 tablet (70 mg total) by mouth weekly before breakfast Take 30 minutes before first food, beverage, or medication of the day , Disp: 4 tablet, Rfl: 5    aspirin (ECOTRIN LOW STRENGTH) 81 mg EC tablet, Take 81 mg by mouth daily, Disp: , Rfl:     atorvastatin (LIPITOR) 80 mg tablet, Take 1 tablet (80 mg total) by mouth daily, Disp: 30 tablet, Rfl: 5    carvedilol (COREG) 3 125 mg tablet, Take 1 tablet (3 125 mg total) by mouth 2 (two) times a day with meals, Disp: 60 tablet, Rfl: 5    FLUoxetine (PROzac) 20 MG tablet, Take 1 tablet (20 mg total) by mouth daily, Disp: 30 tablet, Rfl: 5    furosemide (LASIX) 20 mg tablet, Take 20 mg by mouth daily, Disp: , Rfl:     losartan (COZAAR) 50 mg tablet, Take 1 tablet (50 mg total) by mouth daily, Disp: 90 tablet, Rfl: 5    Magnesium 250 MG TABS, Take 2 tablets by mouth daily, Disp: , Rfl:     nitroglycerin (NITROSTAT) 0 4 mg SL tablet, Place 0 4 mg under the tongue as needed for chest pain, Disp: , Rfl:   Allergies   Allergen Reactions    Lisinopril      Past Medical History:   Diagnosis Date    Athscl heart disease of native coronary artery w/o ang pctrs     Cerebral infarction ([address] )     Essential (primary) hypertension     Hx of echocardiogram 07/31/2017    2D w/ CFD; EF0 15 (15%) mild LVH  Mild mitral and aortic regurgitation  Trace tricuspid regurgitation    / EF0 35 (35%) hypokinesis f anteroseptum, anterolateral wall, mid inferoseptum, and apex  Mildly dilated left atrium  Mild to moderate MR   Mild TR  7/11/17    Ischemic cardiomyopathy     Mixed hyperlipidemia     Nonrheumatic mitral (valve) insufficiency     Presence of automatic (implantable) cardiac defibrillator            Social History     Tobacco Use   Smoking Status Former Smoker   Smokeless Tobacco Never Used

## 2020-02-03 ENCOUNTER — REMOTE DEVICE CLINIC VISIT (OUTPATIENT)
Dept: CARDIOLOGY CLINIC | Facility: CLINIC | Age: 75
End: 2020-02-03
Payer: COMMERCIAL

## 2020-02-03 DIAGNOSIS — Z45.02 ENCOUNTER FOR IMPLANTABLE DEFIBRILLATOR REPROGRAMMING OR CHECK: ICD-10-CM

## 2020-02-03 DIAGNOSIS — I42.0 DILATED CARDIOMYOPATHY (HCC): Primary | ICD-10-CM

## 2020-02-03 PROCEDURE — 93295 DEV INTERROG REMOTE 1/2/MLT: CPT | Performed by: INTERNAL MEDICINE

## 2020-02-03 PROCEDURE — 93296 REM INTERROG EVL PM/IDS: CPT | Performed by: INTERNAL MEDICINE

## 2020-02-04 ENCOUNTER — HOSPITAL ENCOUNTER (OUTPATIENT)
Dept: NON INVASIVE DIAGNOSTICS | Facility: CLINIC | Age: 75
Discharge: HOME | End: 2020-02-04
Payer: COMMERCIAL

## 2020-02-04 DIAGNOSIS — I42.0 DILATED CARDIOMYOPATHY (HCC): ICD-10-CM

## 2020-02-04 DIAGNOSIS — I25.10 CORONARY ARTERY DISEASE INVOLVING NATIVE CORONARY ARTERY OF NATIVE HEART WITHOUT ANGINA PECTORIS: ICD-10-CM

## 2020-02-04 PROCEDURE — 93306 TTE W/DOPPLER COMPLETE: CPT

## 2020-02-04 PROCEDURE — 93306 TTE W/DOPPLER COMPLETE: CPT | Performed by: INTERNAL MEDICINE

## 2020-02-18 ENCOUNTER — OFFICE VISIT (OUTPATIENT)
Dept: CARDIOLOGY CLINIC | Facility: CLINIC | Age: 75
End: 2020-02-18
Payer: COMMERCIAL

## 2020-02-18 VITALS
DIASTOLIC BLOOD PRESSURE: 84 MMHG | SYSTOLIC BLOOD PRESSURE: 138 MMHG | HEART RATE: 64 BPM | WEIGHT: 113 LBS | BODY MASS INDEX: 21.34 KG/M2 | HEIGHT: 61 IN

## 2020-02-18 DIAGNOSIS — I25.10 CORONARY ARTERY DISEASE INVOLVING NATIVE CORONARY ARTERY OF NATIVE HEART WITHOUT ANGINA PECTORIS: ICD-10-CM

## 2020-02-18 DIAGNOSIS — I42.0 DILATED CARDIOMYOPATHY (HCC): Primary | ICD-10-CM

## 2020-02-18 DIAGNOSIS — I10 ESSENTIAL HYPERTENSION: ICD-10-CM

## 2020-02-18 PROCEDURE — 99214 OFFICE O/P EST MOD 30 MIN: CPT | Performed by: INTERNAL MEDICINE

## 2020-02-18 NOTE — PROGRESS NOTES
Patient ID: Gilson Acuña is a 69 y o  female  Plan:      Hypertension  Better controlled  CAD (coronary artery disease)  No current symptoms  Prior dominant Cx stent  Prior anterior wall MI by myoview  Dilated cardiomyopathy (HCC)  Severe but recent nuke seemed c/w prior MI  Same with echo  BiV ICD in place  Fortunately no symptoms  Follow up Plan: 6 month f/u visit  HPI: The patient is seen in f/u  Regarding the above  Since the last visit she has felt reasonably well  Blood pressure now better controlled  No shocks  To reiterate, in 2010 she underwent drug-eluting stent to the proximal  Circumflex artery  This was a left dominant circulation  She was thought to have cardiomyopathy out of proportion to CAD however  She has had a biventricular ICD placed in 2010  I believe this was replaced in 2018  She is from the [place] and continues to be a [employer] fan! Most recent or relevant cardiac/vascular testing:    Echocardiogram 02/04/2020:  Ejection fraction 20%  Global dysfunction but also evidence for prior anteroapical MI  Lexiscan 1/2/2020: EF 33%  No ischemia  Prior anteroapical MI   2010: YAMILE to proximal dominant circumflex artery  2018:  Replacement of biiventricular [employer] ICD  Originally placed 2010  Past Surgical History:   Procedure Laterality Date    CARDIAC CATHETERIZATION  07/10/2017    High LVEDP  Severe biv failure 4+MR  YAMILE x2 to prox Cx  RCA non dominant  LAD ok      CARDIAC DEFIBRILLATOR PLACEMENT  2012    Replaced battery 5-3-18     CARDIAC PACEMAKER PLACEMENT  2010    Dual chamber Guidant Biv AICD, gen change BiV ICD [place] [facility] 5/18    CORONARY ANGIOPLASTY WITH STENT PLACEMENT  2010    stenting to circumflex     CMP:   Lab Results   Component Value Date     04/25/2018    K 4 7 06/18/2019    K 4 1 04/25/2018     06/18/2019     04/25/2018    CO2 27 06/18/2019    CO2 33 (H) 04/25/2018    BUN 23 06/18/2019    BUN 23 04/25/2018    CREATININE 0 83 06/18/2019    CREATININE 0 77 04/25/2018    EGFR 70 06/18/2019       Lipid Profile:   Lab Results   Component Value Date    TRIG 106 06/18/2019    HDL 40 06/18/2019         Review of Systems   10  point ROS  was otherwise non pertinent or negative except as per HPI or as below  Gait: Normal         Objective:     /84   Pulse 64   Ht 5' 1" (1 549 m)   Wt 51 3 kg (113 lb)   BMI 21 35 kg/m²     PHYSICAL EXAM:  General:  Normal appearance in no distress  Eyes:  Anicteric  Oral mucosa:  Moist   Neck:  No JVD  Carotid upstrokes are brisk without bruits  No masses  Chest:  Clear to auscultation and percussion  ICD left subclavian  Cardiac:  No  palpable PMI  Normal S1 and S2  No murmur gallop or rub  Abdomen:  Soft and nontender  No palpable organomegaly or aortic enlargement  Extremities:  No peripheral edema  Musculoskeletal:  Symmetric  Vascular:  Femoral pulses are brisk without bruits  Popliteal pulses are intact bilaterally  Pedal pulses are absent  Neuro:  Grossly symmetric  Psych:  Alert and oriented x3            Current Outpatient Medications:     alendronate (FOSAMAX) 70 mg tablet, Take 1 tablet (70 mg total) by mouth weekly before breakfast Take 30 minutes before first food, beverage, or medication of the day , Disp: 4 tablet, Rfl: 5    aspirin (ECOTRIN LOW STRENGTH) 81 mg EC tablet, Take 81 mg by mouth daily, Disp: , Rfl:     atorvastatin (LIPITOR) 80 mg tablet, Take 1 tablet (80 mg total) by mouth daily, Disp: 30 tablet, Rfl: 5    carvedilol (COREG) 3 125 mg tablet, Take 1 tablet (3 125 mg total) by mouth 2 (two) times a day with meals, Disp: 60 tablet, Rfl: 5    FLUoxetine (PROzac) 20 MG tablet, Take 1 tablet (20 mg total) by mouth daily, Disp: 30 tablet, Rfl: 5    furosemide (LASIX) 20 mg tablet, Take 20 mg by mouth daily, Disp: , Rfl:     losartan (COZAAR) 50 mg tablet, Take 1 tablet (50 mg total) by mouth daily, Disp: 90 tablet, Rfl: 5    Magnesium 250 MG TABS, Take 2 tablets by mouth daily, Disp: , Rfl:     nitroglycerin (NITROSTAT) 0 4 mg SL tablet, Place 0 4 mg under the tongue as needed for chest pain, Disp: , Rfl:   Allergies   Allergen Reactions    Lisinopril      Past Medical History:   Diagnosis Date    Age-related osteoporosis without current pathological fracture     Athscl heart disease of native coronary artery w/o ang pctrs     Body mass index (BMI) 20 0-20 9, adult     Boxy mass index 20-24 - normal     Carotid artery occlusion     Cerebral infarction (HCC)     Chronic ischemic heart disease, unspecified     Essential (primary) hypertension     Hx of echocardiogram 07/31/2017    2D w/ CFD; EF0 15 (15%) mild LVH  Mild mitral and aortic regurgitation  Trace tricuspid regurgitation    / EF0 35 (35%) hypokinesis f anteroseptum, anterolateral wall, mid inferoseptum, and apex  Mildly dilated left atrium  Mild to moderate MR   Mild TR  7/11/17    Hypercholesterolemia     Ischemic cardiomyopathy     Mixed hyperlipidemia     Myocardial infarction ([address] )     Nonrheumatic mitral (valve) insufficiency     Presence of automatic (implantable) cardiac defibrillator            Social History     Tobacco Use   Smoking Status Former Smoker   Smokeless Tobacco Never Used

## 2020-02-18 NOTE — ASSESSMENT & PLAN NOTE
Severe but recent nuke seemed c/w prior MI  Same with echo  BiV ICD in place  Fortunately no symptoms

## 2020-03-02 ENCOUNTER — OFFICE VISIT (OUTPATIENT)
Dept: NEPHROLOGY | Facility: CLINIC | Age: 75
End: 2020-03-02
Payer: COMMERCIAL

## 2020-03-02 VITALS
WEIGHT: 110.8 LBS | BODY MASS INDEX: 20.92 KG/M2 | SYSTOLIC BLOOD PRESSURE: 128 MMHG | DIASTOLIC BLOOD PRESSURE: 84 MMHG | HEART RATE: 82 BPM | OXYGEN SATURATION: 94 % | HEIGHT: 61 IN

## 2020-03-02 DIAGNOSIS — Z95.810 PRESENCE OF AUTOMATIC (IMPLANTABLE) CARDIAC DEFIBRILLATOR: ICD-10-CM

## 2020-03-02 DIAGNOSIS — I10 ESSENTIAL HYPERTENSION: ICD-10-CM

## 2020-03-02 DIAGNOSIS — I50.42 CHRONIC COMBINED SYSTOLIC AND DIASTOLIC CONGESTIVE HEART FAILURE (HCC): ICD-10-CM

## 2020-03-02 DIAGNOSIS — N18.2 STAGE 2 CHRONIC KIDNEY DISEASE: ICD-10-CM

## 2020-03-02 DIAGNOSIS — M81.0 AGE-RELATED OSTEOPOROSIS WITHOUT CURRENT PATHOLOGICAL FRACTURE: ICD-10-CM

## 2020-03-02 DIAGNOSIS — I42.0 DILATED CARDIOMYOPATHY (HCC): ICD-10-CM

## 2020-03-02 DIAGNOSIS — I25.10 CORONARY ARTERY DISEASE INVOLVING NATIVE CORONARY ARTERY OF NATIVE HEART WITHOUT ANGINA PECTORIS: Primary | ICD-10-CM

## 2020-03-02 DIAGNOSIS — I63.9 CEREBROVASCULAR ACCIDENT (CVA), UNSPECIFIED MECHANISM (HCC): ICD-10-CM

## 2020-03-02 DIAGNOSIS — E78.2 MIXED HYPERLIPIDEMIA: ICD-10-CM

## 2020-03-02 PROCEDURE — 99214 OFFICE O/P EST MOD 30 MIN: CPT | Performed by: INTERNAL MEDICINE

## 2020-03-02 RX ORDER — ALENDRONATE SODIUM 70 MG/1
TABLET ORAL
Qty: 4 TABLET | Refills: 0 | Status: SHIPPED | OUTPATIENT
Start: 2020-03-02 | End: 2020-03-18 | Stop reason: SDUPTHER

## 2020-03-02 NOTE — PROGRESS NOTES
[address] [facility] [place], [place]    Name: Rosa Fischer  YOB: 1945      Assessment/Plan:     Carlos Carrillo has a history of systolic congestive failure with a recent stress test an echo which demonstrated an ejection fraction of 20%  She uses a walker to walk around the outside  She appears to be well compensated with no shortness of breath on exertion  A kidney function is stable with a GFR of 70 which places her in stage 2 chronic kidney disease  Blood pressure is well controlled will check lab work including lipids and see her back       Problem List Items Addressed This Visit        Cardiovascular and Mediastinum    Hypertension    Dilated cardiomyopathy [address] [address] )    CVA (cerebral vascular accident) ([facility] [address] )    CHF (congestive heart failure) ([facility] [address] )    CAD (coronary artery disease) - Primary       Genitourinary    Stage 2 chronic kidney disease       Other    Hyperlipidemia    Presence of automatic (implantable) cardiac defibrillator            Subjective:      Patient ID: Rosa Fischer is a 69 y o  female  HPI  Has a history of hypertension and dilated cardiomyopathy  She has hyperlipidemia  She uses a walker when she goes out  Has a bivent ICD with no firings  Has CAD with a prior anterior wall MI and dominant Cx stent  EF is 20%    The following portions of the patient's history were reviewed and updated as appropriate: allergies, current medications, past family history, past medical history, past social history, past surgical history and problem list     Review of Systems   Constitutional: Negative  Negative for activity change, appetite change, fatigue and unexpected weight change  HENT: Negative  Negative for congestion, ear discharge, ear pain, trouble swallowing and voice change  Eyes: Negative  Negative for pain, discharge and visual disturbance  Respiratory: Negative for cough, chest tightness, shortness of breath and wheezing  Cardiovascular: Negative for chest pain, palpitations and leg swelling  Gastrointestinal: Negative for abdominal distention, abdominal pain, blood in stool, constipation, diarrhea, nausea and vomiting  Endocrine: Negative for heat intolerance, polydipsia, polyphagia and polyuria  Genitourinary: Negative for decreased urine volume, difficulty urinating, frequency and urgency  Musculoskeletal: Positive for arthralgias and gait problem  Negative for back pain  Skin: Negative for color change, pallor and rash  Neurological: Negative for dizziness, tremors, weakness, numbness and headaches  Hematological: Negative for adenopathy  Does not bruise/bleed easily  Psychiatric/Behavioral: Negative for agitation           Social History     Socioeconomic History    Marital status: Single     Spouse name: None    Number of children: None    Years of education: None    Highest education level: None   Occupational History    Occupation:     Social Needs    Financial resource strain: None    Food insecurity:     Worry: None     Inability: None    Transportation needs:     Medical: None     Non-medical: None   Tobacco Use    Smoking status: Former Smoker    Smokeless tobacco: Never Used   Substance and Sexual Activity    Alcohol use: Yes     Comment: Rarely     Drug use: Never    Sexual activity: None   Lifestyle    Physical activity:     Days per week: None     Minutes per session: None    Stress: None   Relationships    Social connections:     Talks on phone: None     Gets together: None     Attends Rastafarian service: None     Active member of club or organization: None     Attends meetings of clubs or organizations: None     Relationship status: None    Intimate partner violence:     Fear of current or ex partner: None     Emotionally abused: None     Physically abused: None     Forced sexual activity: None   Other Topics Concern    None   Social History Narrative     - As per [employer] Consumes on average 1 cup of regular coffee per day      Past Medical History:   Diagnosis Date    Age-related osteoporosis without current pathological fracture     Athscl heart disease of native coronary artery w/o ang pctrs     Body mass index (BMI) 20 0-20 9, adult     Boxy mass index 20-24 - normal     Carotid artery occlusion     Cerebral infarction (HCC)     Chronic ischemic heart disease, unspecified     Essential (primary) hypertension     Hx of echocardiogram 07/31/2017    2D w/ CFD; EF0 15 (15%) mild LVH  Mild mitral and aortic regurgitation  Trace tricuspid regurgitation    / EF0 35 (35%) hypokinesis f anteroseptum, anterolateral wall, mid inferoseptum, and apex  Mildly dilated left atrium  Mild to moderate MR  Mild TR  7/11/17    Hypercholesterolemia     Ischemic cardiomyopathy     Mixed hyperlipidemia     Myocardial infarction ([address] )     Nonrheumatic mitral (valve) insufficiency     Presence of automatic (implantable) cardiac defibrillator      Past Surgical History:   Procedure Laterality Date    CARDIAC CATHETERIZATION  07/10/2017    High LVEDP  Severe biv failure 4+MR  YAMILE x2 to prox Cx  RCA non dominant  LAD ok      CARDIAC DEFIBRILLATOR PLACEMENT  2012    Replaced battery 5-3-18     CARDIAC PACEMAKER PLACEMENT  2010    Dual chamber Guidant Biv AICD, gen change BiV ICD [place] [facility] 5/18    CORONARY ANGIOPLASTY WITH STENT PLACEMENT  2010    stenting to circumflex       Current Outpatient Medications:     alendronate (FOSAMAX) 70 mg tablet, Take 1 tablet (70 mg total) by mouth weekly before breakfast Take 30 minutes before first food, beverage, or medication of the day , Disp: 4 tablet, Rfl: 5    aspirin (ECOTRIN LOW STRENGTH) 81 mg EC tablet, Take 81 mg by mouth daily, Disp: , Rfl:     atorvastatin (LIPITOR) 80 mg tablet, Take 1 tablet (80 mg total) by mouth daily, Disp: 30 tablet, Rfl: 5    carvedilol (COREG) 3 125 mg tablet, Take 1 tablet (3 125 mg total) by mouth 2 (two) times a day with meals, Disp: 60 tablet, Rfl: 5    FLUoxetine (PROzac) 20 MG tablet, Take 1 tablet (20 mg total) by mouth daily, Disp: 30 tablet, Rfl: 5    furosemide (LASIX) 20 mg tablet, Take 20 mg by mouth daily, Disp: , Rfl:     losartan (COZAAR) 50 mg tablet, Take 1 tablet (50 mg total) by mouth daily, Disp: 90 tablet, Rfl: 5    Magnesium 250 MG TABS, Take 2 tablets by mouth daily, Disp: , Rfl:     nitroglycerin (NITROSTAT) 0 4 mg SL tablet, Place 0 4 mg under the tongue as needed for chest pain, Disp: , Rfl:     Lab Results   Component Value Date     04/25/2018    SODIUM 139 06/18/2019    K 4 7 06/18/2019     06/18/2019    CO2 27 06/18/2019    ANIONGAP 8 1 04/25/2018    AGAP 8 06/18/2019    BUN 23 06/18/2019    CREATININE 0 83 06/18/2019    GLUC 92 06/18/2019    GLUF 98 08/28/2018    CALCIUM 9 9 06/18/2019    AST 19 06/18/2019    ALT 10 06/18/2019    ALKPHOS 119 06/18/2019    TP 6 6 06/18/2019    TBILI 0 70 06/18/2019    EGFR 70 06/18/2019     Lab Results   Component Value Date    WBC 6 00 06/18/2019    HGB 12 9 06/18/2019    HCT 38 0 06/18/2019    MCV 93 06/18/2019     06/18/2019     Lab Results   Component Value Date    CHOLESTEROL 148 06/18/2019    CHOLESTEROL 134 08/28/2018     Lab Results   Component Value Date    HDL 40 06/18/2019    HDL 32 (L) 08/28/2018     Lab Results   Component Value Date    LDLCALC 87 06/18/2019    LDLCALC 69 08/28/2018     Lab Results   Component Value Date    TRIG 106 06/18/2019    TRIG 166 08/28/2018     No results found for: CHOLHDL  No results found for: [id number], TSH  Lab Results   Component Value Date    CALCIUM 9 9 06/18/2019     No results found for: SPEP, UPEP  No results found for: MICROALBUR, [id number]        Objective:      /84 (BP Location: Left arm, Patient Position: Sitting, Cuff Size: Standard)   Pulse 82   Ht 5' 1" (1 549 m)   Wt 50 3 kg (110 lb 12 8 oz)   SpO2 94%   BMI 20 94 kg/m²          Physical Exam   Constitutional: She is oriented to person, place, and time  She appears well-developed and well-nourished  No distress  HENT:   Head: Normocephalic  Right Ear: External ear normal    Left Ear: External ear normal    Nose: Nose normal    Mouth/Throat: Oropharynx is clear and moist    Eyes: Pupils are equal, round, and reactive to light  Conjunctivae are normal    Cardiovascular: Normal rate, regular rhythm and normal heart sounds  No murmur heard  Distant BS   Pulmonary/Chest: Effort normal and breath sounds normal  No respiratory distress  She has no wheezes  She has no rales  Abdominal: Soft  Bowel sounds are normal  She exhibits no distension  There is no tenderness  There is no rebound and no guarding  Musculoskeletal: Normal range of motion  She exhibits no edema  Neurological: She is alert and oriented to person, place, and time  Skin: Skin is warm and dry  Capillary refill takes less than 2 seconds  She is not diaphoretic  Psychiatric: She has a normal mood and affect

## 2020-03-03 ENCOUNTER — TELEPHONE (OUTPATIENT)
Dept: NEPHROLOGY | Facility: CLINIC | Age: 75
End: 2020-03-03

## 2020-03-03 NOTE — TELEPHONE ENCOUNTER
Sarai Gonzales called this morning and stated that the Chantel Perry order that was put in needs to have accessories listed as well, she is requesting a seat, wheels, and hand breaks  I know it does state on the order "Does This require Any Of The Following Items: Rollator (seat, brakes, basket)" but she wants to make sure it says wheels as well  Please advise

## 2020-03-03 NOTE — TELEPHONE ENCOUNTER
Faxed script over to [facility] equipment in [place] as this is the closest to [place]  Etelvina stated that when she roomed the patient during her appt she was made aware that they would not ship the walker, it would need to be fitted for her  [phone]

## 2020-03-12 NOTE — PROGRESS NOTES
[employer] remote check biv icd  3 mode switches longest 10 seconds  Normal battery function      Current Outpatient Medications:     alendronate (FOSAMAX) 70 mg tablet, TAKE 1 TABLET BY MOUTH EVERY WEEK,STAY UPRIGHT FOR 30 MINUTES AFTER TAKING THIS PILL, Disp: 4 tablet, Rfl: 0    aspirin (ECOTRIN LOW STRENGTH) 81 mg EC tablet, Take 81 mg by mouth daily, Disp: , Rfl:     atorvastatin (LIPITOR) 80 mg tablet, Take 1 tablet (80 mg total) by mouth daily, Disp: 30 tablet, Rfl: 5    carvedilol (COREG) 3 125 mg tablet, Take 1 tablet (3 125 mg total) by mouth 2 (two) times a day with meals, Disp: 60 tablet, Rfl: 5    FLUoxetine (PROzac) 20 MG tablet, Take 1 tablet (20 mg total) by mouth daily, Disp: 30 tablet, Rfl: 5    furosemide (LASIX) 20 mg tablet, Take 20 mg by mouth daily, Disp: , Rfl:     losartan (COZAAR) 50 mg tablet, Take 1 tablet (50 mg total) by mouth daily, Disp: 90 tablet, Rfl: 5    Magnesium 250 MG TABS, Take 2 tablets by mouth daily, Disp: , Rfl:     nitroglycerin (NITROSTAT) 0 4 mg SL tablet, Place 0 4 mg under the tongue as needed for chest pain, Disp: , Rfl:

## 2020-03-13 ENCOUNTER — APPOINTMENT (OUTPATIENT)
Dept: LAB | Facility: CLINIC | Age: 75
End: 2020-03-13
Payer: COMMERCIAL

## 2020-03-13 DIAGNOSIS — E78.2 MIXED HYPERLIPIDEMIA: ICD-10-CM

## 2020-03-13 DIAGNOSIS — N18.2 STAGE 2 CHRONIC KIDNEY DISEASE: ICD-10-CM

## 2020-03-13 DIAGNOSIS — I25.10 CORONARY ARTERY DISEASE INVOLVING NATIVE CORONARY ARTERY OF NATIVE HEART WITHOUT ANGINA PECTORIS: ICD-10-CM

## 2020-03-13 DIAGNOSIS — I50.42 CHRONIC COMBINED SYSTOLIC AND DIASTOLIC CONGESTIVE HEART FAILURE (HCC): ICD-10-CM

## 2020-03-13 LAB
25(OH)D3 SERPL-MCNC: 41.2 NG/ML (ref 30–100)
ALBUMIN SERPL BCP-MCNC: 3.6 G/DL (ref 3.5–5)
ALP SERPL-CCNC: 145 U/L (ref 46–116)
ALT SERPL W P-5'-P-CCNC: 18 U/L (ref 12–78)
ANION GAP SERPL CALCULATED.3IONS-SCNC: 6 MMOL/L (ref 4–13)
AST SERPL W P-5'-P-CCNC: 27 U/L (ref 5–45)
BACTERIA UR QL AUTO: NORMAL /HPF
BASOPHILS # BLD AUTO: 0.05 THOUSANDS/ΜL (ref 0–0.1)
BASOPHILS NFR BLD AUTO: 1 % (ref 0–1)
BILIRUB SERPL-MCNC: 0.55 MG/DL (ref 0.2–1)
BILIRUB UR QL STRIP: NEGATIVE
BUN SERPL-MCNC: 21 MG/DL (ref 5–25)
CALCIUM SERPL-MCNC: 9.3 MG/DL (ref 8.3–10.1)
CHLORIDE SERPL-SCNC: 105 MMOL/L (ref 100–108)
CHOLEST SERPL-MCNC: 134 MG/DL (ref 50–200)
CLARITY UR: CLEAR
CO2 SERPL-SCNC: 27 MMOL/L (ref 21–32)
COLOR UR: YELLOW
CREAT SERPL-MCNC: 0.75 MG/DL (ref 0.6–1.3)
CREAT UR-MCNC: 49.3 MG/DL
CREAT UR-MCNC: 49.3 MG/DL
EOSINOPHIL # BLD AUTO: 0.11 THOUSAND/ΜL (ref 0–0.61)
EOSINOPHIL NFR BLD AUTO: 2 % (ref 0–6)
ERYTHROCYTE [DISTWIDTH] IN BLOOD BY AUTOMATED COUNT: 13 % (ref 11.6–15.1)
GFR SERPL CREATININE-BSD FRML MDRD: 79 ML/MIN/1.73SQ M
GLUCOSE P FAST SERPL-MCNC: 92 MG/DL (ref 65–99)
GLUCOSE UR STRIP-MCNC: NEGATIVE MG/DL
HCT VFR BLD AUTO: 39.2 % (ref 34.8–46.1)
HDLC SERPL-MCNC: 47 MG/DL
HGB BLD-MCNC: 12.4 G/DL (ref 11.5–15.4)
HGB UR QL STRIP.AUTO: NEGATIVE
HYALINE CASTS #/AREA URNS LPF: NORMAL /LPF
IMM GRANULOCYTES # BLD AUTO: 0.01 THOUSAND/UL (ref 0–0.2)
IMM GRANULOCYTES NFR BLD AUTO: 0 % (ref 0–2)
KETONES UR STRIP-MCNC: NEGATIVE MG/DL
LDLC SERPL CALC-MCNC: 69 MG/DL (ref 0–100)
LEUKOCYTE ESTERASE UR QL STRIP: NEGATIVE
LYMPHOCYTES # BLD AUTO: 2.1 THOUSANDS/ΜL (ref 0.6–4.47)
LYMPHOCYTES NFR BLD AUTO: 38 % (ref 14–44)
MAGNESIUM SERPL-MCNC: 2.1 MG/DL (ref 1.6–2.6)
MCH RBC QN AUTO: 30.5 PG (ref 26.8–34.3)
MCHC RBC AUTO-ENTMCNC: 31.6 G/DL (ref 31.4–37.4)
MCV RBC AUTO: 97 FL (ref 82–98)
MICROALBUMIN UR-MCNC: 6.5 MG/L (ref 0–20)
MICROALBUMIN/CREAT 24H UR: 13 MG/G CREATININE (ref 0–30)
MONOCYTES # BLD AUTO: 0.79 THOUSAND/ΜL (ref 0.17–1.22)
MONOCYTES NFR BLD AUTO: 14 % (ref 4–12)
NEUTROPHILS # BLD AUTO: 2.54 THOUSANDS/ΜL (ref 1.85–7.62)
NEUTS SEG NFR BLD AUTO: 45 % (ref 43–75)
NITRITE UR QL STRIP: NEGATIVE
NON-SQ EPI CELLS URNS QL MICRO: NORMAL /HPF
NONHDLC SERPL-MCNC: 87 MG/DL
NRBC BLD AUTO-RTO: 0 /100 WBCS
PH UR STRIP.AUTO: 7 [PH]
PLATELET # BLD AUTO: 148 THOUSANDS/UL (ref 149–390)
PMV BLD AUTO: 10.8 FL (ref 8.9–12.7)
POTASSIUM SERPL-SCNC: 4.5 MMOL/L (ref 3.5–5.3)
PROT SERPL-MCNC: 7 G/DL (ref 6.4–8.2)
PROT UR STRIP-MCNC: NEGATIVE MG/DL
PROT UR-MCNC: 11 MG/DL
PROT/CREAT UR: 0.22 MG/G{CREAT} (ref 0–0.1)
RBC # BLD AUTO: 4.06 MILLION/UL (ref 3.81–5.12)
RBC #/AREA URNS AUTO: NORMAL /HPF
SODIUM SERPL-SCNC: 138 MMOL/L (ref 136–145)
SP GR UR STRIP.AUTO: 1.01 (ref 1–1.03)
TRIGL SERPL-MCNC: 89 MG/DL
URATE SERPL-MCNC: 4.6 MG/DL (ref 2–6.8)
UROBILINOGEN UR QL STRIP.AUTO: 0.2 E.U./DL
WBC # BLD AUTO: 5.6 THOUSAND/UL (ref 4.31–10.16)
WBC #/AREA URNS AUTO: NORMAL /HPF

## 2020-03-13 PROCEDURE — 80053 COMPREHEN METABOLIC PANEL: CPT

## 2020-03-13 PROCEDURE — 36415 COLL VENOUS BLD VENIPUNCTURE: CPT

## 2020-03-13 PROCEDURE — 82306 VITAMIN D 25 HYDROXY: CPT

## 2020-03-13 PROCEDURE — 82570 ASSAY OF URINE CREATININE: CPT

## 2020-03-13 PROCEDURE — 84156 ASSAY OF PROTEIN URINE: CPT

## 2020-03-13 PROCEDURE — 84550 ASSAY OF BLOOD/URIC ACID: CPT

## 2020-03-13 PROCEDURE — 83735 ASSAY OF MAGNESIUM: CPT

## 2020-03-13 PROCEDURE — 80061 LIPID PANEL: CPT

## 2020-03-13 PROCEDURE — 81001 URINALYSIS AUTO W/SCOPE: CPT

## 2020-03-13 PROCEDURE — 82043 UR ALBUMIN QUANTITATIVE: CPT

## 2020-03-13 PROCEDURE — 85025 COMPLETE CBC W/AUTO DIFF WBC: CPT

## 2020-03-16 DIAGNOSIS — R94.5 LIVER FUNCTION STUDY, ABNORMAL: Primary | ICD-10-CM

## 2020-03-18 DIAGNOSIS — M81.0 AGE-RELATED OSTEOPOROSIS WITHOUT CURRENT PATHOLOGICAL FRACTURE: ICD-10-CM

## 2020-03-18 RX ORDER — ALENDRONATE SODIUM 70 MG/1
TABLET ORAL
Qty: 4 TABLET | Refills: 0 | Status: SHIPPED | OUTPATIENT
Start: 2020-03-18 | End: 2020-04-27

## 2020-03-24 ENCOUNTER — TELEPHONE (OUTPATIENT)
Dept: NEPHROLOGY | Facility: CLINIC | Age: 75
End: 2020-03-24

## 2020-04-02 ENCOUNTER — TELEPHONE (OUTPATIENT)
Dept: NEPHROLOGY | Facility: CLINIC | Age: 75
End: 2020-04-02

## 2020-04-18 DIAGNOSIS — I50.42 CHRONIC COMBINED SYSTOLIC AND DIASTOLIC CONGESTIVE HEART FAILURE (HCC): Primary | ICD-10-CM

## 2020-04-18 RX ORDER — FUROSEMIDE 20 MG/1
TABLET ORAL
Qty: 90 TABLET | Refills: 0 | Status: SHIPPED | OUTPATIENT
Start: 2020-04-18 | End: 2020-07-01

## 2020-04-24 ENCOUNTER — HOSPITAL ENCOUNTER (EMERGENCY)
Facility: HOSPITAL | Age: 75
Discharge: HOME | End: 2020-04-25
Attending: EMERGENCY MEDICINE | Admitting: EMERGENCY MEDICINE
Payer: COMMERCIAL

## 2020-04-24 ENCOUNTER — APPOINTMENT (EMERGENCY)
Dept: CT IMAGING | Facility: HOSPITAL | Age: 75
End: 2020-04-24
Payer: COMMERCIAL

## 2020-04-24 ENCOUNTER — TELEPHONE (OUTPATIENT)
Dept: OTHER | Facility: OTHER | Age: 75
End: 2020-04-24

## 2020-04-24 ENCOUNTER — APPOINTMENT (EMERGENCY)
Dept: RADIOLOGY | Facility: HOSPITAL | Age: 75
End: 2020-04-24
Payer: COMMERCIAL

## 2020-04-24 VITALS
WEIGHT: 103 LBS | TEMPERATURE: 98 F | BODY MASS INDEX: 19.45 KG/M2 | HEART RATE: 60 BPM | HEIGHT: 61 IN | SYSTOLIC BLOOD PRESSURE: 175 MMHG | RESPIRATION RATE: 18 BRPM | OXYGEN SATURATION: 96 % | DIASTOLIC BLOOD PRESSURE: 79 MMHG

## 2020-04-24 DIAGNOSIS — R07.89 CHEST WALL PAIN: Primary | ICD-10-CM

## 2020-04-24 DIAGNOSIS — N39.0 UTI (URINARY TRACT INFECTION): ICD-10-CM

## 2020-04-24 LAB
ALBUMIN SERPL BCP-MCNC: 4.1 G/DL (ref 3.5–5.7)
ALP SERPL-CCNC: 114 U/L (ref 55–165)
ALT SERPL W P-5'-P-CCNC: 11 U/L (ref 7–52)
ANION GAP SERPL CALCULATED.3IONS-SCNC: 5 MMOL/L (ref 4–13)
AST SERPL W P-5'-P-CCNC: 26 U/L (ref 13–39)
BACTERIA UR QL AUTO: ABNORMAL /HPF
BASOPHILS # BLD AUTO: 0.1 THOUSANDS/ΜL (ref 0–0.1)
BASOPHILS NFR BLD AUTO: 1 % (ref 0–2)
BILIRUB SERPL-MCNC: 0.4 MG/DL (ref 0.2–1)
BILIRUB UR QL STRIP: NEGATIVE
BUN SERPL-MCNC: 28 MG/DL (ref 7–25)
CALCIUM SERPL-MCNC: 9.8 MG/DL (ref 8.6–10.5)
CHLORIDE SERPL-SCNC: 102 MMOL/L (ref 98–107)
CLARITY UR: CLEAR
CO2 SERPL-SCNC: 31 MMOL/L (ref 21–31)
COLOR UR: YELLOW
CREAT SERPL-MCNC: 1.07 MG/DL (ref 0.6–1.2)
D DIMER PPP FEU-MCNC: 2.03 UG/ML FEU
EOSINOPHIL # BLD AUTO: 0.1 THOUSAND/ΜL (ref 0–0.61)
EOSINOPHIL NFR BLD AUTO: 1 % (ref 0–5)
ERYTHROCYTE [DISTWIDTH] IN BLOOD BY AUTOMATED COUNT: 13.3 % (ref 11.5–14.5)
GFR SERPL CREATININE-BSD FRML MDRD: 51 ML/MIN/1.73SQ M
GLUCOSE SERPL-MCNC: 116 MG/DL (ref 65–99)
GLUCOSE UR STRIP-MCNC: NEGATIVE MG/DL
HCT VFR BLD AUTO: 40.6 % (ref 42–47)
HGB BLD-MCNC: 13.2 G/DL (ref 12–16)
HGB UR QL STRIP.AUTO: NEGATIVE
KETONES UR STRIP-MCNC: NEGATIVE MG/DL
LEUKOCYTE ESTERASE UR QL STRIP: ABNORMAL
LIPASE SERPL-CCNC: 23 U/L (ref 11–82)
LYMPHOCYTES # BLD AUTO: 1.9 THOUSANDS/ΜL (ref 0.6–4.47)
LYMPHOCYTES NFR BLD AUTO: 25 % (ref 21–51)
MCH RBC QN AUTO: 30.7 PG (ref 26–34)
MCHC RBC AUTO-ENTMCNC: 32.6 G/DL (ref 31–37)
MCV RBC AUTO: 94 FL (ref 81–99)
MONOCYTES # BLD AUTO: 0.6 THOUSAND/ΜL (ref 0.17–1.22)
MONOCYTES NFR BLD AUTO: 8 % (ref 2–12)
NEUTROPHILS # BLD AUTO: 4.9 THOUSANDS/ΜL (ref 1.4–6.5)
NEUTS SEG NFR BLD AUTO: 65 % (ref 42–75)
NITRITE UR QL STRIP: NEGATIVE
NON-SQ EPI CELLS URNS QL MICRO: ABNORMAL /HPF
PH UR STRIP.AUTO: 6 [PH]
PLATELET # BLD AUTO: 156 THOUSANDS/UL (ref 149–390)
PMV BLD AUTO: 9.3 FL (ref 8.6–11.7)
POTASSIUM SERPL-SCNC: 5 MMOL/L (ref 3.5–5.5)
PROT SERPL-MCNC: 6.9 G/DL (ref 6.4–8.9)
PROT UR STRIP-MCNC: NEGATIVE MG/DL
RBC # BLD AUTO: 4.31 MILLION/UL (ref 3.9–5.2)
RBC #/AREA URNS AUTO: ABNORMAL /HPF
SODIUM SERPL-SCNC: 138 MMOL/L (ref 134–143)
SP GR UR STRIP.AUTO: 1.02 (ref 1–1.03)
TROPONIN I SERPL-MCNC: <0.03 NG/ML
UROBILINOGEN UR QL STRIP.AUTO: 0.2 E.U./DL
WBC # BLD AUTO: 7.5 THOUSAND/UL (ref 4.8–10.8)
WBC #/AREA URNS AUTO: ABNORMAL /HPF

## 2020-04-24 PROCEDURE — 84484 ASSAY OF TROPONIN QUANT: CPT | Performed by: EMERGENCY MEDICINE

## 2020-04-24 PROCEDURE — 96374 THER/PROPH/DIAG INJ IV PUSH: CPT

## 2020-04-24 PROCEDURE — 36415 COLL VENOUS BLD VENIPUNCTURE: CPT | Performed by: EMERGENCY MEDICINE

## 2020-04-24 PROCEDURE — 80053 COMPREHEN METABOLIC PANEL: CPT | Performed by: EMERGENCY MEDICINE

## 2020-04-24 PROCEDURE — 99285 EMERGENCY DEPT VISIT HI MDM: CPT | Performed by: EMERGENCY MEDICINE

## 2020-04-24 PROCEDURE — 71045 X-RAY EXAM CHEST 1 VIEW: CPT

## 2020-04-24 PROCEDURE — 71275 CT ANGIOGRAPHY CHEST: CPT

## 2020-04-24 PROCEDURE — 93005 ELECTROCARDIOGRAM TRACING: CPT

## 2020-04-24 PROCEDURE — 83690 ASSAY OF LIPASE: CPT | Performed by: EMERGENCY MEDICINE

## 2020-04-24 PROCEDURE — 81001 URINALYSIS AUTO W/SCOPE: CPT | Performed by: EMERGENCY MEDICINE

## 2020-04-24 PROCEDURE — 99284 EMERGENCY DEPT VISIT MOD MDM: CPT

## 2020-04-24 PROCEDURE — 85025 COMPLETE CBC W/AUTO DIFF WBC: CPT | Performed by: EMERGENCY MEDICINE

## 2020-04-24 PROCEDURE — 85379 FIBRIN DEGRADATION QUANT: CPT | Performed by: EMERGENCY MEDICINE

## 2020-04-24 RX ORDER — KETOROLAC TROMETHAMINE 30 MG/ML
15 INJECTION, SOLUTION INTRAMUSCULAR; INTRAVENOUS ONCE
Status: COMPLETED | OUTPATIENT
Start: 2020-04-24 | End: 2020-04-24

## 2020-04-24 RX ADMIN — IOHEXOL 100 ML: 350 INJECTION, SOLUTION INTRAVENOUS at 23:37

## 2020-04-24 RX ADMIN — KETOROLAC TROMETHAMINE 15 MG: 30 INJECTION, SOLUTION INTRAMUSCULAR at 22:11

## 2020-04-25 DIAGNOSIS — M81.0 AGE-RELATED OSTEOPOROSIS WITHOUT CURRENT PATHOLOGICAL FRACTURE: ICD-10-CM

## 2020-04-25 LAB
ATRIAL RATE: 62 BPM
P AXIS: 81 DEGREES
PR INTERVAL: 94 MS
QRS AXIS: 260 DEGREES
QRSD INTERVAL: 150 MS
QT INTERVAL: 498 MS
QTC INTERVAL: 505 MS
T WAVE AXIS: 87 DEGREES
VENTRICULAR RATE: 62 BPM

## 2020-04-25 PROCEDURE — 93010 ELECTROCARDIOGRAM REPORT: CPT | Performed by: INTERNAL MEDICINE

## 2020-04-25 RX ORDER — CEPHALEXIN 500 MG/1
500 CAPSULE ORAL EVERY 6 HOURS SCHEDULED
Qty: 20 CAPSULE | Refills: 0 | Status: SHIPPED | OUTPATIENT
Start: 2020-04-25 | End: 2020-04-25 | Stop reason: SDUPTHER

## 2020-04-25 RX ORDER — CEPHALEXIN 500 MG/1
500 CAPSULE ORAL EVERY 6 HOURS SCHEDULED
Qty: 20 CAPSULE | Refills: 0 | Status: SHIPPED | OUTPATIENT
Start: 2020-04-25 | End: 2020-04-30

## 2020-04-27 RX ORDER — ALENDRONATE SODIUM 70 MG/1
TABLET ORAL
Qty: 4 TABLET | Refills: 0 | Status: SHIPPED | OUTPATIENT
Start: 2020-04-27 | End: 2020-05-18

## 2020-05-03 ENCOUNTER — TELEPHONE (OUTPATIENT)
Dept: OTHER | Facility: OTHER | Age: 75
End: 2020-05-03

## 2020-05-04 ENCOUNTER — REMOTE DEVICE CLINIC VISIT (OUTPATIENT)
Dept: CARDIOLOGY CLINIC | Facility: CLINIC | Age: 75
End: 2020-05-04
Payer: COMMERCIAL

## 2020-05-04 ENCOUNTER — TELEPHONE (OUTPATIENT)
Dept: NEPHROLOGY | Facility: CLINIC | Age: 75
End: 2020-05-04

## 2020-05-04 DIAGNOSIS — I42.0 DILATED CARDIOMYOPATHY (HCC): Primary | ICD-10-CM

## 2020-05-04 DIAGNOSIS — Z45.02 ENCOUNTER FOR IMPLANTABLE DEFIBRILLATOR REPROGRAMMING OR CHECK: ICD-10-CM

## 2020-05-04 DIAGNOSIS — N39.0 URINARY TRACT INFECTION WITHOUT HEMATURIA, SITE UNSPECIFIED: Primary | ICD-10-CM

## 2020-05-04 PROCEDURE — 93296 REM INTERROG EVL PM/IDS: CPT | Performed by: INTERNAL MEDICINE

## 2020-05-04 PROCEDURE — 93295 DEV INTERROG REMOTE 1/2/MLT: CPT | Performed by: INTERNAL MEDICINE

## 2020-05-05 DIAGNOSIS — F32.A DEPRESSION, UNSPECIFIED DEPRESSION TYPE: ICD-10-CM

## 2020-05-05 RX ORDER — FLUOXETINE HYDROCHLORIDE 20 MG/1
CAPSULE ORAL
Qty: 30 CAPSULE | Refills: 3 | Status: SHIPPED | OUTPATIENT
Start: 2020-05-05 | End: 2020-09-19

## 2020-05-06 ENCOUNTER — APPOINTMENT (OUTPATIENT)
Dept: LAB | Facility: CLINIC | Age: 75
End: 2020-05-06
Payer: COMMERCIAL

## 2020-05-06 DIAGNOSIS — I10 ESSENTIAL HYPERTENSION: ICD-10-CM

## 2020-05-06 DIAGNOSIS — N39.0 URINARY TRACT INFECTION WITHOUT HEMATURIA, SITE UNSPECIFIED: ICD-10-CM

## 2020-05-06 DIAGNOSIS — E78.2 MIXED HYPERLIPIDEMIA: ICD-10-CM

## 2020-05-06 PROCEDURE — 87086 URINE CULTURE/COLONY COUNT: CPT

## 2020-05-06 RX ORDER — CARVEDILOL 3.12 MG/1
3.12 TABLET ORAL 2 TIMES DAILY WITH MEALS
Qty: 60 TABLET | Refills: 5 | Status: SHIPPED | OUTPATIENT
Start: 2020-05-06 | End: 2020-11-02 | Stop reason: SDUPTHER

## 2020-05-06 RX ORDER — ATORVASTATIN CALCIUM 80 MG/1
80 TABLET, FILM COATED ORAL DAILY
Qty: 30 TABLET | Refills: 5 | Status: SHIPPED | OUTPATIENT
Start: 2020-05-06 | End: 2020-10-22

## 2020-05-09 LAB — BACTERIA UR CULT: NORMAL

## 2020-05-18 ENCOUNTER — TELEPHONE (OUTPATIENT)
Dept: NEPHROLOGY | Facility: CLINIC | Age: 75
End: 2020-05-18

## 2020-05-18 DIAGNOSIS — M81.0 AGE-RELATED OSTEOPOROSIS WITHOUT CURRENT PATHOLOGICAL FRACTURE: ICD-10-CM

## 2020-05-18 RX ORDER — ALENDRONATE SODIUM 70 MG/1
TABLET ORAL
Qty: 4 TABLET | Refills: 0 | Status: SHIPPED | OUTPATIENT
Start: 2020-05-18 | End: 2020-05-21

## 2020-05-21 DIAGNOSIS — M81.0 AGE-RELATED OSTEOPOROSIS WITHOUT CURRENT PATHOLOGICAL FRACTURE: ICD-10-CM

## 2020-05-21 RX ORDER — ALENDRONATE SODIUM 70 MG/1
TABLET ORAL
Qty: 4 TABLET | Refills: 0 | Status: SHIPPED | OUTPATIENT
Start: 2020-05-21 | End: 2020-06-25

## 2020-06-24 DIAGNOSIS — M81.0 AGE-RELATED OSTEOPOROSIS WITHOUT CURRENT PATHOLOGICAL FRACTURE: ICD-10-CM

## 2020-06-25 RX ORDER — ALENDRONATE SODIUM 70 MG/1
TABLET ORAL
Qty: 4 TABLET | Refills: 0 | Status: SHIPPED | OUTPATIENT
Start: 2020-06-25 | End: 2020-07-14

## 2020-07-01 DIAGNOSIS — I50.42 CHRONIC COMBINED SYSTOLIC AND DIASTOLIC CONGESTIVE HEART FAILURE (HCC): ICD-10-CM

## 2020-07-01 RX ORDER — FUROSEMIDE 20 MG/1
TABLET ORAL
Qty: 90 TABLET | Refills: 0 | Status: SHIPPED | OUTPATIENT
Start: 2020-07-01 | End: 2020-07-14

## 2020-07-14 DIAGNOSIS — I50.42 CHRONIC COMBINED SYSTOLIC AND DIASTOLIC CONGESTIVE HEART FAILURE (HCC): ICD-10-CM

## 2020-07-14 DIAGNOSIS — M81.0 AGE-RELATED OSTEOPOROSIS WITHOUT CURRENT PATHOLOGICAL FRACTURE: ICD-10-CM

## 2020-07-14 RX ORDER — ALENDRONATE SODIUM 70 MG/1
TABLET ORAL
Qty: 4 TABLET | Refills: 0 | Status: SHIPPED | OUTPATIENT
Start: 2020-07-14 | End: 2020-08-15

## 2020-07-14 RX ORDER — FUROSEMIDE 20 MG/1
TABLET ORAL
Qty: 90 TABLET | Refills: 0 | Status: SHIPPED | OUTPATIENT
Start: 2020-07-14 | End: 2020-10-22

## 2020-08-10 ENCOUNTER — REMOTE DEVICE CLINIC VISIT (OUTPATIENT)
Dept: CARDIOLOGY CLINIC | Facility: CLINIC | Age: 75
End: 2020-08-10
Payer: COMMERCIAL

## 2020-08-10 DIAGNOSIS — I42.0 DILATED CARDIOMYOPATHY (HCC): Primary | ICD-10-CM

## 2020-08-10 DIAGNOSIS — Z45.02 ENCOUNTER FOR IMPLANTABLE DEFIBRILLATOR REPROGRAMMING OR CHECK: ICD-10-CM

## 2020-08-10 DIAGNOSIS — M81.0 AGE-RELATED OSTEOPOROSIS WITHOUT CURRENT PATHOLOGICAL FRACTURE: ICD-10-CM

## 2020-08-10 PROCEDURE — 93296 REM INTERROG EVL PM/IDS: CPT | Performed by: INTERNAL MEDICINE

## 2020-08-10 PROCEDURE — 93295 DEV INTERROG REMOTE 1/2/MLT: CPT | Performed by: INTERNAL MEDICINE

## 2020-08-12 NOTE — PROGRESS NOTES
[employer] remote check biv icd  No alerts  Normal batttery function  Current Outpatient Medications:     alendronate (FOSAMAX) 70 mg tablet, TAKE 1 TABLET BY MOUTH EVERY 7 DAYS,STAY UPRIGHT FOR 30 MINUTES AFTERTAKING THIS PILL, Disp: 4 tablet, Rfl: 0    aspirin (ECOTRIN LOW STRENGTH) 81 mg EC tablet, Take 81 mg by mouth daily, Disp: , Rfl:     atorvastatin (LIPITOR) 80 mg tablet, Take 1 tablet (80 mg total) by mouth daily, Disp: 30 tablet, Rfl: 5    carvedilol (COREG) 3 125 mg tablet, Take 1 tablet (3 125 mg total) by mouth 2 (two) times a day with meals, Disp: 60 tablet, Rfl: 5    FLUoxetine (PROzac) 20 mg capsule, TAKE (1) CAPSULE DAILY  , Disp: 30 capsule, Rfl: 3    furosemide (LASIX) 20 mg tablet, TAKE (1) TABLET DAILY  , Disp: 90 tablet, Rfl: 0    losartan (COZAAR) 50 mg tablet, Take 1 tablet (50 mg total) by mouth daily, Disp: 90 tablet, Rfl: 5    Magnesium 250 MG TABS, Take 2 tablets by mouth daily, Disp: , Rfl:     nitroglycerin (NITROSTAT) 0 4 mg SL tablet, Place 0 4 mg under the tongue as needed for chest pain, Disp: , Rfl:

## 2020-08-15 RX ORDER — ALENDRONATE SODIUM 70 MG/1
TABLET ORAL
Qty: 4 TABLET | Refills: 0 | Status: SHIPPED | OUTPATIENT
Start: 2020-08-15 | End: 2020-09-23 | Stop reason: SDUPTHER

## 2020-08-24 ENCOUNTER — OFFICE VISIT (OUTPATIENT)
Dept: CARDIOLOGY CLINIC | Facility: CLINIC | Age: 75
End: 2020-08-24
Payer: COMMERCIAL

## 2020-08-24 VITALS
SYSTOLIC BLOOD PRESSURE: 104 MMHG | DIASTOLIC BLOOD PRESSURE: 68 MMHG | BODY MASS INDEX: 19.63 KG/M2 | WEIGHT: 104 LBS | HEIGHT: 61 IN | HEART RATE: 60 BPM

## 2020-08-24 DIAGNOSIS — E78.2 MIXED HYPERLIPIDEMIA: ICD-10-CM

## 2020-08-24 DIAGNOSIS — Z95.810 PRESENCE OF AUTOMATIC (IMPLANTABLE) CARDIAC DEFIBRILLATOR: ICD-10-CM

## 2020-08-24 DIAGNOSIS — I25.10 CORONARY ARTERY DISEASE INVOLVING NATIVE CORONARY ARTERY OF NATIVE HEART WITHOUT ANGINA PECTORIS: ICD-10-CM

## 2020-08-24 DIAGNOSIS — I42.0 DILATED CARDIOMYOPATHY (HCC): Primary | ICD-10-CM

## 2020-08-24 PROCEDURE — 99214 OFFICE O/P EST MOD 30 MIN: CPT | Performed by: PHYSICIAN ASSISTANT

## 2020-08-24 RX ORDER — NITROGLYCERIN 0.4 MG/1
0.4 TABLET SUBLINGUAL AS NEEDED
Qty: 25 TABLET | Refills: 5 | Status: SHIPPED | OUTPATIENT
Start: 2020-08-24 | End: 2021-12-29 | Stop reason: SDUPTHER

## 2020-08-24 NOTE — ASSESSMENT & PLAN NOTE
Lexiscan and echo earlier this year consistent w/ prior anteroapical MI  BiV ICD placed in 2010 with change-out in 2018    Euvolemic on exam   Continue carvedilol, losartan, lasix

## 2020-08-24 NOTE — ASSESSMENT & PLAN NOTE
Prior dominant Cx stent in 2010  Recent Lexiscan suggestive of prior anterior wall MI     No recent angina or NTG use  Continue ASA, carvedilol, atorvastatin

## 2020-08-24 NOTE — PROGRESS NOTES
[facility]     Tera Laury / 77 y o  female / : 1945 / MRN: [id number]  Date of Visit: 20    ASSESSMENT/PLAN     Dilated cardiomyopathy ([address] )  Brian Michaels and echo earlier this year consistent w/ prior anteroapical MI  BiV ICD placed in  with change-out in 2018  Euvolemic on exam   Continue carvedilol, losartan, lasix     CAD (coronary artery disease)  Prior dominant Cx stent in   Recent Lexiscan suggestive of prior anterior wall MI  No recent angina or NTG use  Continue ASA, carvedilol, atorvastatin    Presence of automatic (implantable) cardiac defibrillator  Device checks since last visit unremarkable  No shocks  Hyperlipidemia  Tolerating high intensity statin therapy  Lipid panel in 2020 was excellent      She is going to try nutritional shakes such as Boost/Ensure to supplement her 2 meals/day  Follow up in 6 months     SUBJECTIVE:  HPI: Tera Schaeffer is a 77 y o  female who presents for follow-up regarding dilated CM s/p ICD, CAD, and HLD  She has been doing well from a cardiac perspective, however appetite has been poor - she has only been eating 2 meals/day  No recent chest pain/pressure, dyspnea, edema, orthopnea, near-syncope or syncope  No ICD shocks  To reiterate, in  she underwent drug-eluting stent to the proximal  Circumflex artery  This was a left dominant circulation  She was thought to have cardiomyopathy out of proportion to CAD  BiV ICD was placed in  and replaced in 2018  Cardiac testing:   Echocardiogram 2020:  Ejection fraction 20%  Global dysfunction but also evidence for prior anteroapical MI  Lexiscan 2020: EF 33%  No ischemia  Prior anteroapical MI   2010: YAMILE to proximal dominant circumflex artery  2018:  Replacement of biiventricular [address] ICD  Originally placed         Allergies   Allergen Reactions    Lisinopril          Current Outpatient Medications:     alendronate (FOSAMAX) 70 mg tablet, TAKE 1 TABLET BY MOUTH EVERY 7 DAYS,STAY UPRIGHT FOR 30 MINUTES AFTER TAKING THIS PILL, Disp: 4 tablet, Rfl: 0    aspirin (ECOTRIN LOW STRENGTH) 81 mg EC tablet, Take 81 mg by mouth daily, Disp: , Rfl:     atorvastatin (LIPITOR) 80 mg tablet, Take 1 tablet (80 mg total) by mouth daily, Disp: 30 tablet, Rfl: 5    carvedilol (COREG) 3 125 mg tablet, Take 1 tablet (3 125 mg total) by mouth 2 (two) times a day with meals, Disp: 60 tablet, Rfl: 5    FLUoxetine (PROzac) 20 mg capsule, TAKE (1) CAPSULE DAILY  , Disp: 30 capsule, Rfl: 3    furosemide (LASIX) 20 mg tablet, TAKE (1) TABLET DAILY  , Disp: 90 tablet, Rfl: 0    losartan (COZAAR) 50 mg tablet, Take 1 tablet (50 mg total) by mouth daily, Disp: 90 tablet, Rfl: 5    Magnesium 250 MG TABS, Take 2 tablets by mouth daily, Disp: , Rfl:     nitroglycerin (NITROSTAT) 0 4 mg SL tablet, Place 1 tablet (0 4 mg total) under the tongue as needed for chest pain, Disp: 25 tablet, Rfl: 5    Past Medical History:   Diagnosis Date    Age-related osteoporosis without current pathological fracture     Athscl heart disease of native coronary artery w/o ang pctrs     Body mass index (BMI) 20 0-20 9, adult     Boxy mass index 20-24 - normal     Carotid artery occlusion     Cerebral infarction (HCC)     Chronic ischemic heart disease, unspecified     Essential (primary) hypertension     Hx of echocardiogram 07/31/2017    2D w/ CFD; EF0 15 (15%) mild LVH  Mild mitral and aortic regurgitation  Trace tricuspid regurgitation    / EF0 35 (35%) hypokinesis f anteroseptum, anterolateral wall, mid inferoseptum, and apex  Mildly dilated left atrium  Mild to moderate MR   Mild TR  7/11/17    Hypercholesterolemia     Ischemic cardiomyopathy     Mixed hyperlipidemia     Myocardial infarction (HCC)     Nonrheumatic mitral (valve) insufficiency     Presence of automatic (implantable) cardiac defibrillator        Family History   Problem Relation Age of Onset  Stroke Father         Cerebrovascular accident        Past Surgical History:   Procedure Laterality Date    CARDIAC CATHETERIZATION  07/10/2017    High LVEDP  Severe biv failure 4+MR  YAMILE x2 to prox Cx  RCA non dominant  LAD ok     [address]  2012    Replaced battery 5-3-18     CARDIAC PACEMAKER PLACEMENT  2010    Dual chamber Guidant Biv AICD, gen change BiV ICD [facility] 5/18    CORONARY ANGIOPLASTY WITH STENT PLACEMENT  2010    stenting to circumflex       Social History     Socioeconomic History    Marital status: Single     Spouse name: Not on file    Number of children: Not on file    Years of education: Not on file    Highest education level: Not on file   Occupational History    Occupation:     Social Needs    Financial resource strain: Not on file    Food insecurity     Worry: Not on file     Inability: Not on file   [employer] needs     Medical: Not on file     Non-medical: Not on file   Tobacco Use    Smoking status: Former Smoker    Smokeless tobacco: Never Used   Substance and Sexual Activity    Alcohol use: Yes     Comment: Rarely     Drug use: Never    Sexual activity: Not on file   Lifestyle    Physical activity     Days per week: Not on file     Minutes per session: Not on file    Stress: Not on file   Relationships    Social connections     Talks on phone: Not on file     Gets together: Not on file     Attends Mandaen service: Not on file     Active member of club or organization: Not on file     Attends meetings of clubs or organizations: Not on file     Relationship status: Not on file    Intimate partner violence     Fear of current or ex partner: Not on file     Emotionally abused: Not on file     Physically abused: Not on file     Forced sexual activity: Not on file   Other Topics Concern    Not on file   Social History Narrative     - As per [address]    Consumes on average 1 cup of regular coffee per day        Review of Systems   Constitution:        Decreased appetite   HENT: Negative  Eyes: Negative  Cardiovascular: Negative for chest pain, claudication, dyspnea on exertion, irregular heartbeat, leg swelling, near-syncope, orthopnea, palpitations, paroxysmal nocturnal dyspnea and syncope  Respiratory: Negative for cough, shortness of breath and wheezing  Endocrine: Negative  Skin: Negative  Musculoskeletal: Negative  Gastrointestinal: Negative  Genitourinary: Negative  Neurological: Negative for dizziness and light-headedness  Psychiatric/Behavioral: Negative  OBJECTIVE:  Vitals: /68   Pulse 60   Ht 5' 1" (1 549 m)   Wt 47 2 kg (104 lb)   BMI 19 65 kg/m²     Physical exam:   Physical Exam   Constitutional: She is oriented to person, place, and time  She appears well-developed and well-nourished  No distress  HENT:   Head: Normocephalic and atraumatic  Eyes: EOM are normal  No scleral icterus  Neck: Normal range of motion  Neck supple  Cardiovascular: Normal rate, regular rhythm, S1 normal and S2 normal    No murmur heard  Device in left subclavian region   Pulmonary/Chest: Effort normal and breath sounds normal  She has no wheezes  She has no rales  Abdominal: Soft  Bowel sounds are normal    Musculoskeletal:         General: No edema  Neurological: She is alert and oriented to person, place, and time  Skin: Skin is warm and dry  Psychiatric: She has a normal mood and affect  Her behavior is normal    Nursing note and vitals reviewed        Lab Results:   No results found for: HGBA1C  No results found for: CHOL  Lab Results   Component Value Date    HDL 47 03/13/2020    HDL 40 06/18/2019    HDL 32 (L) 08/28/2018     Lab Results   Component Value Date    LDLCALC 69 03/13/2020    LDLCALC 87 06/18/2019    LDLCALC 69 08/28/2018     Lab Results   Component Value Date    TRIG 89 03/13/2020    TRIG 106 06/18/2019    TRIG 166 08/28/2018     No results found for: CHOLHDL

## 2020-09-03 ENCOUNTER — OFFICE VISIT (OUTPATIENT)
Dept: NEPHROLOGY | Facility: CLINIC | Age: 75
End: 2020-09-03
Payer: COMMERCIAL

## 2020-09-03 VITALS
HEIGHT: 61 IN | SYSTOLIC BLOOD PRESSURE: 134 MMHG | TEMPERATURE: 97.1 F | BODY MASS INDEX: 19.07 KG/M2 | HEART RATE: 65 BPM | OXYGEN SATURATION: 95 % | DIASTOLIC BLOOD PRESSURE: 84 MMHG | WEIGHT: 101 LBS

## 2020-09-03 DIAGNOSIS — E78.2 MIXED HYPERLIPIDEMIA: ICD-10-CM

## 2020-09-03 DIAGNOSIS — I25.10 CORONARY ARTERY DISEASE INVOLVING NATIVE CORONARY ARTERY OF NATIVE HEART WITHOUT ANGINA PECTORIS: ICD-10-CM

## 2020-09-03 DIAGNOSIS — I42.0 DILATED CARDIOMYOPATHY (HCC): Primary | ICD-10-CM

## 2020-09-03 DIAGNOSIS — R63.4 WEIGHT LOSS: ICD-10-CM

## 2020-09-03 DIAGNOSIS — I10 ESSENTIAL HYPERTENSION: ICD-10-CM

## 2020-09-03 DIAGNOSIS — Z95.810 PRESENCE OF AUTOMATIC (IMPLANTABLE) CARDIAC DEFIBRILLATOR: ICD-10-CM

## 2020-09-03 DIAGNOSIS — N18.2 STAGE 2 CHRONIC KIDNEY DISEASE: ICD-10-CM

## 2020-09-03 PROCEDURE — 99214 OFFICE O/P EST MOD 30 MIN: CPT | Performed by: INTERNAL MEDICINE

## 2020-09-03 NOTE — PROGRESS NOTES
[facility]    Name: Balwinder Weinberg  YOB: 1945      Assessment/Plan:           Problem List Items Addressed This Visit        Cardiovascular and Mediastinum    Hypertension     Well controlled         Relevant Orders    CBC and differential    Comprehensive metabolic panel    TSH + Free T4    LDL cholesterol, direct    Triglycerides    Microalbumin / creatinine urine ratio    Protein / creatinine ratio, urine    Uric acid    Urinalysis with microscopic    Dilated cardiomyopathy ([address] ) - Primary     Seen by cardiology  euvolemic           Relevant Orders    CBC and differential    Comprehensive metabolic panel    TSH + Free T4    LDL cholesterol, direct    Triglycerides    Microalbumin / creatinine urine ratio    Protein / creatinine ratio, urine    Uric acid    Urinalysis with microscopic    CAD (coronary artery disease)       Genitourinary    Stage 2 chronic kidney disease     Recheck labs and urinary protein  History of proteinuria  Due to cardiorenal syndrome due to low EF and dilated cardiomyopathy            Other    Hyperlipidemia     Maintain LDL< 70         Relevant Orders    LDL cholesterol, direct    Triglycerides    Presence of automatic (implantable) cardiac defibrillator     Has not fired         Weight loss     Will get Boost Plus  Check thyroid studies         Relevant Orders    TSH + Free T4    LDL cholesterol, direct            Subjective:      Patient ID: Balwinder Weinberg is a 77 y o  female  HPI  She has a history of hypertension, dilated cardiomyopathy, CHF, CVA and stage 2 CKD  She saw cardiology and told them she was not eating well and they recommended Boost   She had a prior anteroapical MI    She had a dominant CX stent in 2010 (drug eluting)  Her AICD has not fired    Last echo 2/4/2020 demonstrated an EF of 20% but 33% on Lexiscan    She has an AICD    The following portions of the patient's history were reviewed and updated as appropriate: allergies, current medications, past family history, past medical history, past social history, past surgical history and problem list     Review of Systems   Constitutional: Positive for appetite change  Negative for chills, diaphoresis, fatigue and fever  Not eating right   HENT: Negative for hearing loss  Eyes: Negative for visual disturbance  Respiratory: Negative for cough, chest tightness and shortness of breath  Cardiovascular: Negative for chest pain, palpitations and leg swelling  Gastrointestinal: Negative for abdominal pain, blood in stool, constipation and diarrhea  Genitourinary: Negative for difficulty urinating and dysuria  Musculoskeletal: Positive for arthralgias and gait problem  Neurological: Negative for dizziness, weakness, light-headedness and headaches  Hematological: Does not bruise/bleed easily  Psychiatric/Behavioral: Negative for dysphoric mood           Social History     Socioeconomic History    Marital status: Single     Spouse name: Not on file    Number of children: Not on file    Years of education: Not on file    Highest education level: Not on file   Occupational History    Occupation:     Social Needs    Financial resource strain: Not on file    Food insecurity     Worry: Not on file     Inability: Not on file   [employer] needs     Medical: Not on file     Non-medical: Not on file   Tobacco Use    Smoking status: Former Smoker    Smokeless tobacco: Never Used   Substance and Sexual Activity    Alcohol use: Yes     Comment: Rarely     Drug use: Never    Sexual activity: Not on file   Lifestyle    Physical activity     Days per week: Not on file     Minutes per session: Not on file    Stress: Not on file   Relationships    Social connections     Talks on phone: Not on file     Gets together: Not on file     Attends [employer] service: Not on file     Active member of club or organization: Not on file Attends meetings of clubs or organizations: Not on file     Relationship status: Not on file    Intimate partner violence     Fear of current or ex partner: Not on file     Emotionally abused: Not on file     Physically abused: Not on file     Forced sexual activity: Not on file   Other Topics Concern    Not on file   Social History Narrative     - As per [address]    Consumes on average 1 cup of regular coffee per day      Past Medical History:   Diagnosis Date    Age-related osteoporosis without current pathological fracture     Athscl heart disease of native coronary artery w/o ang pctrs     Body mass index (BMI) 20 0-20 9, adult     Boxy mass index 20-24 - normal     Carotid artery occlusion     Cerebral infarction ([facility] )     Chronic ischemic heart disease, unspecified     Essential (primary) hypertension     Hx of echocardiogram 07/31/2017    2D w/ CFD; EF0 15 (15%) mild LVH  Mild mitral and aortic regurgitation  Trace tricuspid regurgitation    / EF0 35 (35%) hypokinesis f anteroseptum, anterolateral wall, mid inferoseptum, and apex  Mildly dilated left atrium  Mild to moderate MR  Mild TR  7/11/17    Hypercholesterolemia     Ischemic cardiomyopathy     Mixed hyperlipidemia     Myocardial infarction ([facility] )     Nonrheumatic mitral (valve) insufficiency     Presence of automatic (implantable) cardiac defibrillator      Past Surgical History:   Procedure Laterality Date    CARDIAC CATHETERIZATION  07/10/2017    High LVEDP  Severe biv failure 4+MR  YAMILE x2 to prox Cx  RCA non dominant  LAD ok      CARDIAC DEFIBRILLATOR PLACEMENT  2012    Replaced battery 5-3-18    Papi Lemme CARDIAC PACEMAKER PLACEMENT  2010    Dual chamber Guidant Biv AICD, gen change BiV ICD [employer] [facility] 5/18    CORONARY ANGIOPLASTY WITH STENT PLACEMENT  2010    stenting to circumflex       Current Outpatient Medications:     alendronate (FOSAMAX) 70 mg tablet, TAKE 1 TABLET BY MOUTH EVERY 7 DAYS,STAY UPRIGHT FOR 30 MINUTES AFTER TAKING THIS PILL, Disp: 4 tablet, Rfl: 0    aspirin (ECOTRIN LOW STRENGTH) 81 mg EC tablet, Take 81 mg by mouth daily, Disp: , Rfl:     atorvastatin (LIPITOR) 80 mg tablet, Take 1 tablet (80 mg total) by mouth daily, Disp: 30 tablet, Rfl: 5    carvedilol (COREG) 3 125 mg tablet, Take 1 tablet (3 125 mg total) by mouth 2 (two) times a day with meals, Disp: 60 tablet, Rfl: 5    FLUoxetine (PROzac) 20 mg capsule, TAKE (1) CAPSULE DAILY  , Disp: 30 capsule, Rfl: 3    furosemide (LASIX) 20 mg tablet, TAKE (1) TABLET DAILY  , Disp: 90 tablet, Rfl: 0    losartan (COZAAR) 50 mg tablet, Take 1 tablet (50 mg total) by mouth daily, Disp: 90 tablet, Rfl: 5    Magnesium 250 MG TABS, Take 2 tablets by mouth daily, Disp: , Rfl:     nitroglycerin (NITROSTAT) 0 4 mg SL tablet, Place 1 tablet (0 4 mg total) under the tongue as needed for chest pain, Disp: 25 tablet, Rfl: 5    Lab Results   Component Value Date     04/25/2018    SODIUM 138 04/24/2020    K 5 0 04/24/2020     04/24/2020    CO2 31 04/24/2020    ANIONGAP 8 1 04/25/2018    AGAP 5 04/24/2020    BUN 28 (H) 04/24/2020    CREATININE 1 07 04/24/2020    GLUC 116 (H) 04/24/2020    GLUF 92 03/13/2020    CALCIUM 9 8 04/24/2020    AST 26 04/24/2020    ALT 11 04/24/2020    ALKPHOS 114 04/24/2020    TP 6 9 04/24/2020    TBILI 0 40 04/24/2020    EGFR 51 04/24/2020     Lab Results   Component Value Date    WBC 7 50 04/24/2020    HGB 13 2 04/24/2020    HCT 40 6 (L) 04/24/2020    MCV 94 04/24/2020     04/24/2020     Lab Results   Component Value Date    CHOLESTEROL 134 03/13/2020    CHOLESTEROL 148 06/18/2019    CHOLESTEROL 134 08/28/2018     Lab Results   Component Value Date    HDL 47 03/13/2020    HDL 40 06/18/2019    HDL 32 (L) 08/28/2018     Lab Results   Component Value Date    LDLCALC 69 03/13/2020    LDLCALC 87 06/18/2019    LDLCALC 69 08/28/2018     Lab Results   Component Value Date    TRIG 89 03/13/2020    TRIG 106 06/18/2019    TRIG 166 08/28/2018     No results found for: CHOLHDL  No results found for: [id number], TSH  Lab Results   Component Value Date    CALCIUM 9 8 04/24/2020     No results found for: SPEP, UPEP  No results found for: JOSEALTJ [id number]        Objective:      /84   Pulse 65   Temp (!) 97 1 °F (36 2 °C) (Temporal)   Ht 5' 1" (1 549 m)   Wt 45 8 kg (101 lb)   SpO2 95%   BMI 19 08 kg/m²     -10 lbs since March     Physical Exam  Constitutional:       Appearance: Normal appearance  She is normal weight  HENT:      Head: Normocephalic and atraumatic  Right Ear: External ear normal       Left Ear: External ear normal    Eyes:      Extraocular Movements: Extraocular movements intact  Pupils: Pupils are equal, round, and reactive to light  Neck:      Musculoskeletal: Normal range of motion and neck supple  Cardiovascular:      Rate and Rhythm: Normal rate and regular rhythm  Heart sounds: No murmur  Pulmonary:      Effort: Pulmonary effort is normal  No respiratory distress  Breath sounds: Normal breath sounds  No wheezing  Abdominal:      General: Bowel sounds are normal       Palpations: Abdomen is soft  Tenderness: There is no abdominal tenderness  Musculoskeletal: Normal range of motion  Right lower leg: No edema  Left lower leg: No edema  Skin:     General: Skin is warm and dry  Coloration: Skin is not pale  Findings: No bruising  Neurological:      General: No focal deficit present  Mental Status: She is alert and oriented to person, place, and time  Gait: Gait abnormal    Psychiatric:         Mood and Affect: Mood normal          Behavior: Behavior normal          Thought Content:  Thought content normal          Judgment: Judgment normal

## 2020-09-03 NOTE — ASSESSMENT & PLAN NOTE
Recheck labs and urinary protein  History of proteinuria  Due to cardiorenal syndrome due to low EF and dilated cardiomyopathy

## 2020-09-08 ENCOUNTER — TELEPHONE (OUTPATIENT)
Dept: NEPHROLOGY | Facility: CLINIC | Age: 75
End: 2020-09-08

## 2020-09-08 NOTE — TELEPHONE ENCOUNTER
That is a one month supply  I am waiting for her chemistry to see if she has enough kidney function to handle it

## 2020-09-08 NOTE — TELEPHONE ENCOUNTER
Called and left message on voicemail that it is for a 1 month supply and that we are waiting on chemistry to see if her kidney function can handle it  She will contact office with any other questions/concerns

## 2020-09-08 NOTE — TELEPHONE ENCOUNTER
Yumiko Benitez called this morning inquiring about the alendronate (FOSAMAX) 70 mg tablet, TAKE 1 TABLET BY MOUTH EVERY 7 DAYS,STAY UPRIGHT FOR 30 MINUTES AFTER TAKING THIS PILL  She was given a quantity of 4 tablets  She wants to know if this was only for a month duration or you would like her to continue taking this, she thought you mentioned to her when she was seen on 9/3 to stop taking this but she wanted to confirm  Please advise

## 2020-09-09 ENCOUNTER — APPOINTMENT (OUTPATIENT)
Dept: LAB | Facility: CLINIC | Age: 75
End: 2020-09-09
Payer: COMMERCIAL

## 2020-09-09 ENCOUNTER — OFFICE VISIT (OUTPATIENT)
Dept: FAMILY MEDICINE CLINIC | Facility: CLINIC | Age: 75
End: 2020-09-09
Payer: COMMERCIAL

## 2020-09-09 VITALS
HEART RATE: 66 BPM | DIASTOLIC BLOOD PRESSURE: 74 MMHG | WEIGHT: 104 LBS | TEMPERATURE: 97.5 F | OXYGEN SATURATION: 99 % | SYSTOLIC BLOOD PRESSURE: 144 MMHG | BODY MASS INDEX: 19.63 KG/M2 | HEIGHT: 61 IN | RESPIRATION RATE: 18 BRPM

## 2020-09-09 DIAGNOSIS — E78.2 MIXED HYPERLIPIDEMIA: ICD-10-CM

## 2020-09-09 DIAGNOSIS — Z76.89 ENCOUNTER TO ESTABLISH CARE: ICD-10-CM

## 2020-09-09 DIAGNOSIS — R05.9 COUGH: ICD-10-CM

## 2020-09-09 DIAGNOSIS — R63.4 WEIGHT LOSS: Primary | ICD-10-CM

## 2020-09-09 DIAGNOSIS — R19.7 DIARRHEA, UNSPECIFIED TYPE: ICD-10-CM

## 2020-09-09 DIAGNOSIS — I10 ESSENTIAL HYPERTENSION: ICD-10-CM

## 2020-09-09 DIAGNOSIS — R63.4 WEIGHT LOSS: ICD-10-CM

## 2020-09-09 DIAGNOSIS — K13.21 LEUKOPLAKIA OF ORAL MUCOSA, INCLUDING TONGUE: ICD-10-CM

## 2020-09-09 DIAGNOSIS — R94.5 LIVER FUNCTION STUDY, ABNORMAL: ICD-10-CM

## 2020-09-09 DIAGNOSIS — I42.0 DILATED CARDIOMYOPATHY (HCC): ICD-10-CM

## 2020-09-09 LAB
ALBUMIN SERPL BCP-MCNC: 3.5 G/DL (ref 3.5–5)
ALP SERPL-CCNC: 136 U/L (ref 46–116)
ALT SERPL W P-5'-P-CCNC: 19 U/L (ref 12–78)
ANION GAP SERPL CALCULATED.3IONS-SCNC: 5 MMOL/L (ref 4–13)
AST SERPL W P-5'-P-CCNC: 27 U/L (ref 5–45)
BACTERIA UR QL AUTO: NORMAL /HPF
BASOPHILS # BLD AUTO: 0.07 THOUSANDS/ΜL (ref 0–0.1)
BASOPHILS NFR BLD AUTO: 1 % (ref 0–1)
BILIRUB SERPL-MCNC: 0.63 MG/DL (ref 0.2–1)
BILIRUB UR QL STRIP: NEGATIVE
BUN SERPL-MCNC: 17 MG/DL (ref 5–25)
CALCIUM SERPL-MCNC: 9.6 MG/DL (ref 8.3–10.1)
CHLORIDE SERPL-SCNC: 105 MMOL/L (ref 100–108)
CLARITY UR: CLEAR
CO2 SERPL-SCNC: 29 MMOL/L (ref 21–32)
COLOR UR: YELLOW
CREAT SERPL-MCNC: 0.78 MG/DL (ref 0.6–1.3)
CREAT UR-MCNC: <13 MG/DL
CREAT UR-MCNC: <13 MG/DL
EOSINOPHIL # BLD AUTO: 0.12 THOUSAND/ΜL (ref 0–0.61)
EOSINOPHIL NFR BLD AUTO: 2 % (ref 0–6)
ERYTHROCYTE [DISTWIDTH] IN BLOOD BY AUTOMATED COUNT: 13.5 % (ref 11.6–15.1)
GFR SERPL CREATININE-BSD FRML MDRD: 75 ML/MIN/1.73SQ M
GLUCOSE P FAST SERPL-MCNC: 88 MG/DL (ref 65–99)
GLUCOSE UR STRIP-MCNC: NEGATIVE MG/DL
HCT VFR BLD AUTO: 38 % (ref 34.8–46.1)
HGB BLD-MCNC: 12.1 G/DL (ref 11.5–15.4)
HGB UR QL STRIP.AUTO: NEGATIVE
HYALINE CASTS #/AREA URNS LPF: NORMAL /LPF
IMM GRANULOCYTES # BLD AUTO: 0.02 THOUSAND/UL (ref 0–0.2)
IMM GRANULOCYTES NFR BLD AUTO: 0 % (ref 0–2)
KETONES UR STRIP-MCNC: NEGATIVE MG/DL
LDLC SERPL DIRECT ASSAY-MCNC: 92 MG/DL (ref 0–100)
LEUKOCYTE ESTERASE UR QL STRIP: NEGATIVE
LYMPHOCYTES # BLD AUTO: 1.64 THOUSANDS/ΜL (ref 0.6–4.47)
LYMPHOCYTES NFR BLD AUTO: 27 % (ref 14–44)
MCH RBC QN AUTO: 30.9 PG (ref 26.8–34.3)
MCHC RBC AUTO-ENTMCNC: 31.8 G/DL (ref 31.4–37.4)
MCV RBC AUTO: 97 FL (ref 82–98)
MICROALBUMIN UR-MCNC: <5 MG/L (ref 0–20)
MONOCYTES # BLD AUTO: 0.58 THOUSAND/ΜL (ref 0.17–1.22)
MONOCYTES NFR BLD AUTO: 10 % (ref 4–12)
NEUTROPHILS # BLD AUTO: 3.64 THOUSANDS/ΜL (ref 1.85–7.62)
NEUTS SEG NFR BLD AUTO: 60 % (ref 43–75)
NITRITE UR QL STRIP: NEGATIVE
NON-SQ EPI CELLS URNS QL MICRO: NORMAL /HPF
NRBC BLD AUTO-RTO: 0 /100 WBCS
PH UR STRIP.AUTO: 6.5 [PH]
PLATELET # BLD AUTO: 163 THOUSANDS/UL (ref 149–390)
PMV BLD AUTO: 11.6 FL (ref 8.9–12.7)
POTASSIUM SERPL-SCNC: 4.1 MMOL/L (ref 3.5–5.3)
PROT SERPL-MCNC: 7.6 G/DL (ref 6.4–8.2)
PROT UR STRIP-MCNC: NEGATIVE MG/DL
PROT UR-MCNC: <6 MG/DL
RBC # BLD AUTO: 3.92 MILLION/UL (ref 3.81–5.12)
RBC #/AREA URNS AUTO: NORMAL /HPF
SODIUM SERPL-SCNC: 139 MMOL/L (ref 136–145)
SP GR UR STRIP.AUTO: 1.01 (ref 1–1.03)
T4 FREE SERPL-MCNC: 1 NG/DL (ref 0.76–1.46)
TRIGL SERPL-MCNC: 113 MG/DL
TSH SERPL DL<=0.05 MIU/L-ACNC: 3.3 UIU/ML (ref 0.36–3.74)
URATE SERPL-MCNC: 4.4 MG/DL (ref 2–6.8)
UROBILINOGEN UR QL STRIP.AUTO: 0.2 E.U./DL
WBC # BLD AUTO: 6.07 THOUSAND/UL (ref 4.31–10.16)
WBC #/AREA URNS AUTO: NORMAL /HPF

## 2020-09-09 PROCEDURE — 99204 OFFICE O/P NEW MOD 45 MIN: CPT | Performed by: FAMILY MEDICINE

## 2020-09-09 PROCEDURE — 84156 ASSAY OF PROTEIN URINE: CPT

## 2020-09-09 PROCEDURE — 84478 ASSAY OF TRIGLYCERIDES: CPT

## 2020-09-09 PROCEDURE — 82043 UR ALBUMIN QUANTITATIVE: CPT

## 2020-09-09 PROCEDURE — 82570 ASSAY OF URINE CREATININE: CPT

## 2020-09-09 PROCEDURE — 84443 ASSAY THYROID STIM HORMONE: CPT

## 2020-09-09 PROCEDURE — 84080 ASSAY ALKALINE PHOSPHATASES: CPT

## 2020-09-09 PROCEDURE — 80053 COMPREHEN METABOLIC PANEL: CPT

## 2020-09-09 PROCEDURE — 36415 COLL VENOUS BLD VENIPUNCTURE: CPT

## 2020-09-09 PROCEDURE — 83721 ASSAY OF BLOOD LIPOPROTEIN: CPT

## 2020-09-09 PROCEDURE — 84439 ASSAY OF FREE THYROXINE: CPT

## 2020-09-09 PROCEDURE — 85025 COMPLETE CBC W/AUTO DIFF WBC: CPT

## 2020-09-09 PROCEDURE — 81001 URINALYSIS AUTO W/SCOPE: CPT

## 2020-09-09 PROCEDURE — 84075 ASSAY ALKALINE PHOSPHATASE: CPT

## 2020-09-09 PROCEDURE — 84550 ASSAY OF BLOOD/URIC ACID: CPT

## 2020-09-09 NOTE — PROGRESS NOTES
Troy Hdez 1945 female MRN: [id number]  [facility] [place]    Visit to Establish Care: Family Medicine    Assessment/Plan     Leukoplakia of oral mucosa, including tongue  - Incidentally noted on surface of tongue, unclear timeline   - Referral to OMS for evaluation     Weight loss  - Per patient highest weight 125 when she was pregnant, in February weight 113->March 110->8/24 104->9/3 101, weight today 104  - Suspect secondary to poor diet, however advised to get blood work done per Dr Ranjeet Ritchie orders to assess thyroid function   - Discussed cancer screening (colonoscopy, mammogram) and per patient has never done in past, not willing to complete  We discussed weight loss can be a sign of cancer, and she understands  Will follow weight closely and revisit at subsequent visits    - Continue Boost supplementation, albumin 4 1 4/24, consider prealbumin next blood work    Cough  - Dry cough over past 2 weeks, on chart review has occurred in past   - Chest XR April 2020- "No acute cardiopulmonary disease"  - No symptoms post nasal drip   - Discussed to monitor, may be secondary to GERD, if persistent can trial Pepcid     Diarrhea  - Resolved, episode of copious diarrhea with 2 isolated falls, likely orthostatic hypotension from diarrhea and dehydration   - Advised if this recurs to call immediately, next visit will further discuss gait stability and fall risk status, consider physical therapy     Norleen Moritz was seen today for establish care and colonoscopy  Diagnoses and all orders for this visit:    Weight loss    Leukoplakia of oral mucosa, including tongue  -     Ambulatory referral to Oral Maxillofacial Surgery;  Future    Cough    Diarrhea, unspecified type    Encounter to establish care        In addition to the above, the patient was counseled on general preventative health care subjects, including but not limited to:  - Nutrition, healthy weight, aerobic and weight-bearing exercise  - Mental health, social support, and self care  - Advised of the importance of dental hygiene and routine dental visits   - Patient made aware of  services at the office  Follow-up in 4 weeks for AMW visit  SUBJECTIVE    HPI:  Maciel Rivera is a 77 y o  female with past medical history significant for hypertension, CAD with history of MI with prior circumflex drug eluting stenting, dilated cardiomyopathy with presence of AICD, stage 2 CKD, hyperlipidemia, history of CVA, PVD, osteoporosis and chronic hearing loss who presented to establish care with this family medicine practice  She is here with her neighbor today who helps transport her to appointments and navigate blood work/testing  Weight loss- Concerned about recent weight loss, per patient has always been a light eater, however worse recently  Per her neighbor she eats "like a bird", typical day she will consume coffee, cake, cereal and a bologna and cheese sandwich  Her neighbor does comment it looks like she has lost too much weight  Her cardiologist and Dr Ag Arrieta recommended Boost which she has been doing  No fevers, chills or night sweats  CAD- Otherwise feeling well, no recent chest pain or shortness of breath, no nitroglycerine use  Follows closely with cardiology  GERD- Will have feelings of reflux/burning once every 3 months or so, she can't tolerate Tums  No sour taste in her mouth, she had had a dry intermittent cough last 2 weeks  Of note, she mentions in the middle of last month there was a couple day period of frequent watery diarrhea, she fell twice coming out of the bathroom  Didn't hit her head, no LOC, no one witnessed this  She remembers the entire fall, doesn't remember feeling lightheaded or dizzy during this  With one fall she fell and then had diarrhea  Has been feeling fine since  Review of Systems   Constitutional: Positive for unexpected weight change   Negative for activity change, appetite change, chills, diaphoresis and fever  HENT: Positive for hearing loss  Negative for congestion and postnasal drip  Eyes: Negative for visual disturbance  Respiratory: Positive for cough  Negative for chest tightness and shortness of breath  Cardiovascular: Negative for chest pain, palpitations and leg swelling  Gastrointestinal: Negative for abdominal pain, blood in stool, constipation, diarrhea, nausea and vomiting  Intermittent reflux/burning sensation   Genitourinary: Negative for decreased urine volume and difficulty urinating  Skin: Negative for rash  Neurological: Negative for dizziness, light-headedness and headaches  Historical Information   Past Medical History:   Diagnosis Date    Age-related osteoporosis without current pathological fracture     Athscl heart disease of native coronary artery w/o ang pctrs     Body mass index (BMI) 20 0-20 9, adult     Boxy mass index 20-24 - normal     Carotid artery occlusion     Cerebral infarction (HCC)     Chronic ischemic heart disease, unspecified     Essential (primary) hypertension     Hx of echocardiogram 07/31/2017    2D w/ CFD; EF0 15 (15%) mild LVH  Mild mitral and aortic regurgitation  Trace tricuspid regurgitation    / EF0 35 (35%) hypokinesis f anteroseptum, anterolateral wall, mid inferoseptum, and apex  Mildly dilated left atrium  Mild to moderate MR  Mild TR  7/11/17    Hypercholesterolemia     Ischemic cardiomyopathy     Mixed hyperlipidemia     Myocardial infarction ([facility] [address] [facility]     Nonrheumatic mitral (valve) insufficiency     Presence of automatic (implantable) cardiac defibrillator      Past Surgical History:   Procedure Laterality Date    CARDIAC CATHETERIZATION  07/10/2017    High LVEDP  Severe biv failure 4+MR  YAMILE x2 to prox Cx  RCA non dominant  LAD ok      CARDIAC DEFIBRILLATOR PLACEMENT  2012    Replaced battery 5-3-18     CARDIAC PACEMAKER PLACEMENT  2010    Dual chamber Guidant Biv AICD, gen change BiV ICD [facility]     CORONARY ANGIOPLASTY WITH STENT PLACEMENT      stenting to circumflex     Family History   Problem Relation Age of Onset    Stroke Father         Cerebrovascular accident      Social History     Socioeconomic History    Marital status: Single     Spouse name: Not on file    Number of children: Not on file    Years of education: Not on file    Highest education level: Not on file   Occupational History    Occupation:     Social Needs    Financial resource strain: Not on file    Food insecurity     Worry: Not on file     Inability: Not on file   [employer] needs     Medical: Not on file     Non-medical: Not on file   Tobacco Use    Smoking status: Former Smoker    Smokeless tobacco: Never Used   Substance and Sexual Activity    Alcohol use: Yes     Comment: Rarely     Drug use: Never    Sexual activity: Not on file   Lifestyle    Physical activity     Days per week: Not on file     Minutes per session: Not on file    Stress: Not on file   Relationships    Social connections     Talks on phone: Not on file     Gets together: Not on file     Attends Jewish service: Not on file     Active member of club or organization: Not on file     Attends meetings of clubs or organizations: Not on file     Relationship status: Not on file    Intimate partner violence     Fear of current or ex partner: Not on file     Emotionally abused: Not on file     Physically abused: Not on file     Forced sexual activity: Not on file   Other Topics Concern    Not on file   Social History Narrative     - As per [address]    Consumes on average 1 cup of regular coffee per day      OB History        4    Para   4    Term   4            AB        Living           SAB        TAB        Ectopic        Multiple        Live Births                       Medications:      Current Outpatient Medications:     alendronate (FOSAMAX) 70 mg tablet, TAKE 1 TABLET BY MOUTH EVERY 7 DAYS,STAY UPRIGHT FOR 30 MINUTES AFTER TAKING THIS PILL, Disp: 4 tablet, Rfl: 0    aspirin (ECOTRIN LOW STRENGTH) 81 mg EC tablet, Take 81 mg by mouth daily, Disp: , Rfl:     atorvastatin (LIPITOR) 80 mg tablet, Take 1 tablet (80 mg total) by mouth daily, Disp: 30 tablet, Rfl: 5    carvedilol (COREG) 3 125 mg tablet, Take 1 tablet (3 125 mg total) by mouth 2 (two) times a day with meals, Disp: 60 tablet, Rfl: 5    FLUoxetine (PROzac) 20 mg capsule, TAKE (1) CAPSULE DAILY  , Disp: 30 capsule, Rfl: 3    furosemide (LASIX) 20 mg tablet, TAKE (1) TABLET DAILY  , Disp: 90 tablet, Rfl: 0    losartan (COZAAR) 50 mg tablet, Take 1 tablet (50 mg total) by mouth daily, Disp: 90 tablet, Rfl: 5    Magnesium 250 MG TABS, Take 2 tablets by mouth daily, Disp: , Rfl:     nitroglycerin (NITROSTAT) 0 4 mg SL tablet, Place 1 tablet (0 4 mg total) under the tongue as needed for chest pain, Disp: 25 tablet, Rfl: 5      Physical Exam:    Physical Exam  Vitals signs and nursing note reviewed  Constitutional:       General: She is not in acute distress  Appearance: Normal appearance  She is not ill-appearing or toxic-appearing  Comments: Chronically ill appearing    HENT:      Head: Normocephalic and atraumatic  Mouth/Throat:      Mouth: Mucous membranes are moist       Pharynx: No posterior oropharyngeal erythema  Comments: Rough patches of white discoloration on tongue, not removable with tongue blade  Eyes:      General:         Right eye: No discharge  Left eye: No discharge  Extraocular Movements: Extraocular movements intact  Conjunctiva/sclera: Conjunctivae normal       Pupils: Pupils are equal, round, and reactive to light  Cardiovascular:      Rate and Rhythm: Normal rate and regular rhythm  Pulmonary:      Effort: Pulmonary effort is normal  No respiratory distress  Breath sounds: Normal breath sounds  No stridor  No wheezing or rhonchi  Abdominal:      General: Bowel sounds are normal  There is no distension  Palpations: Abdomen is soft  There is no mass  Tenderness: There is no abdominal tenderness  There is no guarding or rebound  Musculoskeletal:      Right lower leg: No edema  Left lower leg: No edema  Neurological:      Mental Status: She is alert and oriented to person, place, and time     Psychiatric:         Mood and Affect: Mood normal          Behavior: Behavior normal             Future Appointments   Date Time Provider Lucrecia Rabago   10/15/2020 12:30 PM DO CHENTE Hi  [facility]   11/16/2020  9:20 AM DEVICE REMOTE BM CARDIO [place] [facility]  [facility]

## 2020-09-10 PROBLEM — I73.9 PERIPHERAL VASCULAR DISEASE (HCC): Status: ACTIVE | Noted: 2020-09-10

## 2020-09-10 PROBLEM — I50.22 CHRONIC SYSTOLIC HEART FAILURE (HCC): Status: ACTIVE | Noted: 2020-09-10

## 2020-09-10 PROBLEM — I05.9 MITRAL VALVE DISORDER: Status: ACTIVE | Noted: 2020-09-10

## 2020-09-10 PROBLEM — F32.A DEPRESSION: Status: ACTIVE | Noted: 2020-09-10

## 2020-09-10 PROBLEM — H26.8 PSEUDOEXFOLIATION OF LENS CAPSULE: Status: ACTIVE | Noted: 2020-09-10

## 2020-09-10 PROBLEM — M12.9 ARTHROPATHY: Status: ACTIVE | Noted: 2020-09-10

## 2020-09-10 PROBLEM — H83.3X9 NOISE-INDUCED HEARING LOSS: Status: ACTIVE | Noted: 2020-09-10

## 2020-09-10 PROBLEM — D51.9 ANEMIA DUE TO VITAMIN B12 DEFICIENCY: Status: ACTIVE | Noted: 2020-09-10

## 2020-09-10 PROBLEM — R05.9 COUGH: Status: ACTIVE | Noted: 2020-09-10

## 2020-09-10 PROBLEM — R19.7 DIARRHEA: Status: ACTIVE | Noted: 2020-09-10

## 2020-09-10 PROBLEM — M81.0 OSTEOPOROSIS: Status: ACTIVE | Noted: 2020-09-10

## 2020-09-10 PROBLEM — H90.3 BILATERAL SENSORINEURAL HEARING LOSS: Status: ACTIVE | Noted: 2020-09-10

## 2020-09-10 PROBLEM — G47.00 INSOMNIA: Status: ACTIVE | Noted: 2020-09-10

## 2020-09-10 NOTE — ASSESSMENT & PLAN NOTE
- Resolved, episode of copious diarrhea with 2 isolated falls, likely orthostatic hypotension from diarrhea and dehydration   - Advised if this recurs to call immediately, next visit will further discuss gait stability and fall risk status, consider physical therapy

## 2020-09-10 NOTE — ASSESSMENT & PLAN NOTE
- Per patient highest weight 125 when she was pregnant, in February weight 113->March 110->8/24 104->9/3 101, weight today 104  - Suspect secondary to poor diet, however advised to get blood work done per Dr Deborah Song orders to assess thyroid function   - Discussed cancer screening (colonoscopy, mammogram) and per patient has never done in past, not willing to complete  We discussed weight loss can be a sign of cancer, and she understands   Will follow weight closely and revisit at subsequent visits    - Continue Boost supplementation, albumin 4 1 4/24, consider prealbumin next blood work

## 2020-09-10 NOTE — ASSESSMENT & PLAN NOTE
- Dry cough over past 2 weeks, on chart review has occurred in past   - Chest XR April 2020- "No acute cardiopulmonary disease"  - No symptoms post nasal drip   - Discussed to monitor, may be secondary to GERD, if persistent can trial Pepcid

## 2020-09-11 LAB
ALP BONE CFR SERPL: 12 % (ref 14–68)
ALP INTEST CFR SERPL: 0 % (ref 0–18)
ALP LIVER CFR SERPL: 88 % (ref 18–85)
ALP SERPL-CCNC: 130 IU/L (ref 39–117)

## 2020-09-18 DIAGNOSIS — F32.A DEPRESSION, UNSPECIFIED DEPRESSION TYPE: ICD-10-CM

## 2020-09-19 RX ORDER — FLUOXETINE HYDROCHLORIDE 20 MG/1
CAPSULE ORAL
Qty: 30 CAPSULE | Refills: 0 | Status: SHIPPED | OUTPATIENT
Start: 2020-09-19 | End: 2020-10-23

## 2020-09-23 ENCOUNTER — TELEPHONE (OUTPATIENT)
Dept: NEPHROLOGY | Facility: CLINIC | Age: 75
End: 2020-09-23

## 2020-09-23 DIAGNOSIS — M81.0 AGE-RELATED OSTEOPOROSIS WITHOUT CURRENT PATHOLOGICAL FRACTURE: ICD-10-CM

## 2020-09-23 RX ORDER — ALENDRONATE SODIUM 70 MG/1
TABLET ORAL
Qty: 4 TABLET | Refills: 0 | Status: SHIPPED | OUTPATIENT
Start: 2020-09-23 | End: 2020-10-22

## 2020-09-23 NOTE — TELEPHONE ENCOUNTER
Pt says that you told her to stop taking her Fosamax until you reviewed her labs  She had lab work done but [employer] heard if she should start taking the med again  If you want her to take it then please send in a new script to the pharmacy

## 2020-10-15 ENCOUNTER — LAB (OUTPATIENT)
Dept: LAB | Facility: CLINIC | Age: 75
End: 2020-10-15
Payer: COMMERCIAL

## 2020-10-15 ENCOUNTER — TELEPHONE (OUTPATIENT)
Dept: FAMILY MEDICINE CLINIC | Facility: CLINIC | Age: 75
End: 2020-10-15

## 2020-10-15 ENCOUNTER — OFFICE VISIT (OUTPATIENT)
Dept: FAMILY MEDICINE CLINIC | Facility: CLINIC | Age: 75
End: 2020-10-15
Payer: COMMERCIAL

## 2020-10-15 VITALS
HEART RATE: 63 BPM | WEIGHT: 102.6 LBS | DIASTOLIC BLOOD PRESSURE: 72 MMHG | HEIGHT: 61 IN | BODY MASS INDEX: 19.37 KG/M2 | RESPIRATION RATE: 20 BRPM | TEMPERATURE: 96.8 F | OXYGEN SATURATION: 96 % | SYSTOLIC BLOOD PRESSURE: 114 MMHG

## 2020-10-15 DIAGNOSIS — Z00.00 MEDICARE ANNUAL WELLNESS VISIT, INITIAL: Primary | ICD-10-CM

## 2020-10-15 DIAGNOSIS — Z12.31 ENCOUNTER FOR SCREENING MAMMOGRAM FOR MALIGNANT NEOPLASM OF BREAST: ICD-10-CM

## 2020-10-15 DIAGNOSIS — Z12.11 SCREENING FOR COLON CANCER: ICD-10-CM

## 2020-10-15 DIAGNOSIS — R26.2 AMBULATORY DYSFUNCTION: ICD-10-CM

## 2020-10-15 DIAGNOSIS — Z11.59 ENCOUNTER FOR HEPATITIS C SCREENING TEST FOR LOW RISK PATIENT: ICD-10-CM

## 2020-10-15 DIAGNOSIS — Z23 ENCOUNTER FOR IMMUNIZATION: ICD-10-CM

## 2020-10-15 LAB — HCV AB SER QL: NORMAL

## 2020-10-15 PROCEDURE — 3288F FALL RISK ASSESSMENT DOCD: CPT | Performed by: FAMILY MEDICINE

## 2020-10-15 PROCEDURE — G0008 ADMIN INFLUENZA VIRUS VAC: HCPCS

## 2020-10-15 PROCEDURE — 90662 IIV NO PRSV INCREASED AG IM: CPT

## 2020-10-15 PROCEDURE — 1125F AMNT PAIN NOTED PAIN PRSNT: CPT | Performed by: FAMILY MEDICINE

## 2020-10-15 PROCEDURE — 86803 HEPATITIS C AB TEST: CPT

## 2020-10-15 PROCEDURE — 3725F SCREEN DEPRESSION PERFORMED: CPT | Performed by: FAMILY MEDICINE

## 2020-10-15 PROCEDURE — 1160F RVW MEDS BY RX/DR IN RCRD: CPT | Performed by: FAMILY MEDICINE

## 2020-10-15 PROCEDURE — 1170F FXNL STATUS ASSESSED: CPT | Performed by: FAMILY MEDICINE

## 2020-10-15 PROCEDURE — 1100F PTFALLS ASSESS-DOCD GE2>/YR: CPT | Performed by: FAMILY MEDICINE

## 2020-10-15 PROCEDURE — G0438 PPPS, INITIAL VISIT: HCPCS | Performed by: FAMILY MEDICINE

## 2020-10-15 PROCEDURE — 1036F TOBACCO NON-USER: CPT | Performed by: FAMILY MEDICINE

## 2020-10-15 PROCEDURE — 36415 COLL VENOUS BLD VENIPUNCTURE: CPT

## 2020-10-15 PROCEDURE — 3078F DIAST BP <80 MM HG: CPT | Performed by: FAMILY MEDICINE

## 2020-10-21 DIAGNOSIS — M81.0 AGE-RELATED OSTEOPOROSIS WITHOUT CURRENT PATHOLOGICAL FRACTURE: ICD-10-CM

## 2020-10-21 DIAGNOSIS — F32.A DEPRESSION, UNSPECIFIED DEPRESSION TYPE: ICD-10-CM

## 2020-10-21 DIAGNOSIS — I50.42 CHRONIC COMBINED SYSTOLIC AND DIASTOLIC CONGESTIVE HEART FAILURE (HCC): ICD-10-CM

## 2020-10-21 DIAGNOSIS — E78.2 MIXED HYPERLIPIDEMIA: ICD-10-CM

## 2020-10-21 DIAGNOSIS — I10 ESSENTIAL HYPERTENSION: Primary | ICD-10-CM

## 2020-10-22 RX ORDER — ALENDRONATE SODIUM 70 MG/1
TABLET ORAL
Qty: 4 TABLET | Refills: 0 | Status: SHIPPED | OUTPATIENT
Start: 2020-10-22 | End: 2020-11-02 | Stop reason: SDUPTHER

## 2020-10-22 RX ORDER — ATORVASTATIN CALCIUM 80 MG/1
TABLET, FILM COATED ORAL
Qty: 30 TABLET | Refills: 0 | Status: SHIPPED | OUTPATIENT
Start: 2020-10-22 | End: 2020-11-02 | Stop reason: SDUPTHER

## 2020-10-22 RX ORDER — FUROSEMIDE 20 MG/1
TABLET ORAL
Qty: 90 TABLET | Refills: 0 | Status: SHIPPED | OUTPATIENT
Start: 2020-10-22 | End: 2020-11-17 | Stop reason: SDUPTHER

## 2020-10-22 RX ORDER — LOSARTAN POTASSIUM 25 MG/1
TABLET ORAL
Qty: 180 TABLET | Refills: 0 | Status: SHIPPED | OUTPATIENT
Start: 2020-10-22 | End: 2021-02-16 | Stop reason: SDUPTHER

## 2020-10-23 RX ORDER — FLUOXETINE HYDROCHLORIDE 20 MG/1
CAPSULE ORAL
Qty: 30 CAPSULE | Refills: 0 | Status: SHIPPED | OUTPATIENT
Start: 2020-10-23 | End: 2020-12-29 | Stop reason: SDUPTHER

## 2020-10-30 DIAGNOSIS — M81.0 AGE-RELATED OSTEOPOROSIS WITHOUT CURRENT PATHOLOGICAL FRACTURE: ICD-10-CM

## 2020-10-30 DIAGNOSIS — I10 ESSENTIAL HYPERTENSION: ICD-10-CM

## 2020-10-30 DIAGNOSIS — F32.A DEPRESSION, UNSPECIFIED DEPRESSION TYPE: ICD-10-CM

## 2020-10-30 DIAGNOSIS — E78.2 MIXED HYPERLIPIDEMIA: ICD-10-CM

## 2020-10-30 RX ORDER — ALENDRONATE SODIUM 70 MG/1
TABLET ORAL
Qty: 4 TABLET | Refills: 0 | OUTPATIENT
Start: 2020-10-30

## 2020-10-30 RX ORDER — FLUOXETINE HYDROCHLORIDE 20 MG/1
CAPSULE ORAL
Qty: 30 CAPSULE | Refills: 0 | OUTPATIENT
Start: 2020-10-30

## 2020-10-30 RX ORDER — CARVEDILOL 3.12 MG/1
TABLET ORAL
Qty: 60 TABLET | Refills: 0 | OUTPATIENT
Start: 2020-10-30

## 2020-10-30 RX ORDER — ATORVASTATIN CALCIUM 80 MG/1
TABLET, FILM COATED ORAL
Qty: 30 TABLET | Refills: 0 | OUTPATIENT
Start: 2020-10-30

## 2020-11-02 RX ORDER — ALENDRONATE SODIUM 70 MG/1
70 TABLET ORAL
Qty: 12 TABLET | Refills: 1 | Status: SHIPPED | OUTPATIENT
Start: 2020-11-02 | End: 2020-11-17 | Stop reason: SDUPTHER

## 2020-11-02 RX ORDER — ATORVASTATIN CALCIUM 80 MG/1
80 TABLET, FILM COATED ORAL DAILY
Qty: 90 TABLET | Refills: 1 | Status: SHIPPED | OUTPATIENT
Start: 2020-11-02 | End: 2020-12-29 | Stop reason: SDUPTHER

## 2020-11-02 RX ORDER — CARVEDILOL 3.12 MG/1
3.12 TABLET ORAL 2 TIMES DAILY WITH MEALS
Qty: 180 TABLET | Refills: 1 | Status: SHIPPED | OUTPATIENT
Start: 2020-11-02 | End: 2020-11-02

## 2020-11-02 RX ORDER — ATORVASTATIN CALCIUM 80 MG/1
80 TABLET, FILM COATED ORAL DAILY
Qty: 90 TABLET | Refills: 1 | Status: SHIPPED | OUTPATIENT
Start: 2020-11-02 | End: 2020-11-02

## 2020-11-02 RX ORDER — CARVEDILOL 3.12 MG/1
3.12 TABLET ORAL 2 TIMES DAILY WITH MEALS
Qty: 180 TABLET | Refills: 1 | Status: SHIPPED | OUTPATIENT
Start: 2020-11-02 | End: 2020-11-03

## 2020-11-03 RX ORDER — CARVEDILOL 3.12 MG/1
3.12 TABLET ORAL 2 TIMES DAILY WITH MEALS
Qty: 180 TABLET | Refills: 1 | Status: SHIPPED | OUTPATIENT
Start: 2020-11-03 | End: 2020-11-17 | Stop reason: SDUPTHER

## 2020-11-16 ENCOUNTER — REMOTE DEVICE CLINIC VISIT (OUTPATIENT)
Dept: CARDIOLOGY CLINIC | Facility: CLINIC | Age: 75
End: 2020-11-16
Payer: COMMERCIAL

## 2020-11-16 DIAGNOSIS — Z45.02 ENCOUNTER FOR IMPLANTABLE DEFIBRILLATOR REPROGRAMMING OR CHECK: ICD-10-CM

## 2020-11-16 DIAGNOSIS — I42.0 DILATED CARDIOMYOPATHY (HCC): Primary | ICD-10-CM

## 2020-11-16 PROCEDURE — 93296 REM INTERROG EVL PM/IDS: CPT | Performed by: INTERNAL MEDICINE

## 2020-11-16 PROCEDURE — 93295 DEV INTERROG REMOTE 1/2/MLT: CPT | Performed by: INTERNAL MEDICINE

## 2020-11-17 DIAGNOSIS — M81.0 AGE-RELATED OSTEOPOROSIS WITHOUT CURRENT PATHOLOGICAL FRACTURE: ICD-10-CM

## 2020-11-17 DIAGNOSIS — I10 ESSENTIAL HYPERTENSION: ICD-10-CM

## 2020-11-17 DIAGNOSIS — I50.42 CHRONIC COMBINED SYSTOLIC AND DIASTOLIC CONGESTIVE HEART FAILURE (HCC): ICD-10-CM

## 2020-11-17 RX ORDER — FUROSEMIDE 20 MG/1
20 TABLET ORAL DAILY
Qty: 90 TABLET | Refills: 1 | Status: SHIPPED | OUTPATIENT
Start: 2020-11-17 | End: 2021-02-14

## 2020-11-17 RX ORDER — ALENDRONATE SODIUM 70 MG/1
70 TABLET ORAL
Qty: 24 TABLET | Refills: 1 | Status: SHIPPED | OUTPATIENT
Start: 2020-11-17 | End: 2021-08-11

## 2020-11-17 RX ORDER — CARVEDILOL 3.12 MG/1
3.12 TABLET ORAL 2 TIMES DAILY WITH MEALS
Qty: 180 TABLET | Refills: 1 | Status: SHIPPED | OUTPATIENT
Start: 2020-11-17 | End: 2021-02-14

## 2020-12-29 DIAGNOSIS — F32.A DEPRESSION, UNSPECIFIED DEPRESSION TYPE: ICD-10-CM

## 2020-12-29 DIAGNOSIS — E78.2 MIXED HYPERLIPIDEMIA: ICD-10-CM

## 2020-12-29 RX ORDER — ATORVASTATIN CALCIUM 80 MG/1
80 TABLET, FILM COATED ORAL DAILY
Qty: 90 TABLET | Refills: 1 | Status: SHIPPED | OUTPATIENT
Start: 2020-12-29 | End: 2021-05-27

## 2020-12-31 RX ORDER — FLUOXETINE HYDROCHLORIDE 20 MG/1
20 CAPSULE ORAL DAILY
Qty: 30 CAPSULE | Refills: 0 | Status: SHIPPED | OUTPATIENT
Start: 2020-12-31 | End: 2021-01-22 | Stop reason: SDUPTHER

## 2021-01-22 DIAGNOSIS — F32.A DEPRESSION, UNSPECIFIED DEPRESSION TYPE: ICD-10-CM

## 2021-01-22 RX ORDER — FLUOXETINE HYDROCHLORIDE 20 MG/1
20 CAPSULE ORAL DAILY
Qty: 30 CAPSULE | Refills: 0 | Status: SHIPPED | OUTPATIENT
Start: 2021-01-22 | End: 2021-02-14

## 2021-01-30 ENCOUNTER — TELEPHONE (OUTPATIENT)
Dept: OTHER | Facility: OTHER | Age: 76
End: 2021-01-30

## 2021-01-30 NOTE — TELEPHONE ENCOUNTER
Pt called to leave a message for the office to give them a call back in regards to their  Medication refill for Fluoxetine 20 mg

## 2021-02-04 DIAGNOSIS — I10 ESSENTIAL HYPERTENSION: ICD-10-CM

## 2021-02-04 RX ORDER — LOSARTAN POTASSIUM 50 MG/1
50 TABLET ORAL DAILY
Qty: 90 TABLET | Refills: 5 | Status: SHIPPED | OUTPATIENT
Start: 2021-02-04

## 2021-02-13 DIAGNOSIS — I50.42 CHRONIC COMBINED SYSTOLIC AND DIASTOLIC CONGESTIVE HEART FAILURE (HCC): ICD-10-CM

## 2021-02-13 DIAGNOSIS — I10 ESSENTIAL HYPERTENSION: ICD-10-CM

## 2021-02-13 DIAGNOSIS — F32.A DEPRESSION, UNSPECIFIED DEPRESSION TYPE: ICD-10-CM

## 2021-02-14 RX ORDER — FUROSEMIDE 20 MG/1
TABLET ORAL
Qty: 90 TABLET | Refills: 0 | Status: SHIPPED | OUTPATIENT
Start: 2021-02-14 | End: 2021-07-15

## 2021-02-14 RX ORDER — CARVEDILOL 3.12 MG/1
TABLET ORAL
Qty: 180 TABLET | Refills: 0 | Status: SHIPPED | OUTPATIENT
Start: 2021-02-14 | End: 2021-07-15

## 2021-02-14 RX ORDER — FLUOXETINE HYDROCHLORIDE 20 MG/1
CAPSULE ORAL
Qty: 30 CAPSULE | Refills: 0 | Status: SHIPPED | OUTPATIENT
Start: 2021-02-14 | End: 2021-03-01

## 2021-02-16 ENCOUNTER — OFFICE VISIT (OUTPATIENT)
Dept: CARDIOLOGY CLINIC | Facility: CLINIC | Age: 76
End: 2021-02-16
Payer: COMMERCIAL

## 2021-02-16 VITALS
HEIGHT: 61 IN | WEIGHT: 110.23 LBS | SYSTOLIC BLOOD PRESSURE: 112 MMHG | BODY MASS INDEX: 20.81 KG/M2 | HEART RATE: 64 BPM | DIASTOLIC BLOOD PRESSURE: 70 MMHG

## 2021-02-16 DIAGNOSIS — I25.5 ISCHEMIC CARDIOMYOPATHY: Primary | ICD-10-CM

## 2021-02-16 DIAGNOSIS — E78.2 MIXED HYPERLIPIDEMIA: ICD-10-CM

## 2021-02-16 DIAGNOSIS — I25.10 ATHEROSCLEROSIS OF NATIVE CORONARY ARTERY OF NATIVE HEART WITHOUT ANGINA PECTORIS: ICD-10-CM

## 2021-02-16 PROBLEM — I42.0 DILATED CARDIOMYOPATHY (HCC): Status: RESOLVED | Noted: 2018-09-01 | Resolved: 2021-02-16

## 2021-02-16 PROCEDURE — 1160F RVW MEDS BY RX/DR IN RCRD: CPT | Performed by: INTERNAL MEDICINE

## 2021-02-16 PROCEDURE — 3008F BODY MASS INDEX DOCD: CPT | Performed by: INTERNAL MEDICINE

## 2021-02-16 PROCEDURE — 99214 OFFICE O/P EST MOD 30 MIN: CPT | Performed by: INTERNAL MEDICINE

## 2021-02-16 PROCEDURE — 1036F TOBACCO NON-USER: CPT | Performed by: INTERNAL MEDICINE

## 2021-02-16 NOTE — PROGRESS NOTES
Patient ID: Teresa Rashid is a 77 y o  female  Plan:      Ischemic cardiomyopathy  Severe but stable and longstanding  ICD in place  She has never received a shock  Hyperlipidemia  Tolerating high-intensity statin therapy  Atherosclerosis of native coronary artery of native heart without angina pectoris  Prior dominant Cx stent in 2010 2020 Lexiscan suggestive of prior anterior wall MI  No recent angina or NTG use  Continue current regimen  Follow up Plan/Other summary comments:  One year EKG, echo, and follow-up visit  HPI:  Patient is seen in follow-up regarding the above issues  There have been no recent changes in her exertional tolerance  No chest pain or chest pressure  No syncope or near syncope  No shocks  To reiterate, in 2010 she underwent drug-eluting stent to the proximal  Circumflex artery  This was a left dominant circulation  She was thought to have cardiomyopathy out of proportion to CAD however  She has had a biventricular ICD placed in 2010  I believe this was replaced in 2018  Most recent or relevant cardiac/vascular testing:    Echocardiogram 02/04/2020:  Ejection fraction 20%  Global dysfunction but also evidence for prior anteroapical MI  Lexiscan 1/2/2020: EF 33%  No ischemia  Prior anteroapical MI   2010: YAMILE to proximal dominant circumflex artery  2018:  Replacement of biiventricular [employer] ICD  Originally placed 2010  Past Surgical History:   Procedure Laterality Date    CARDIAC CATHETERIZATION  07/10/2017    High LVEDP  Severe biv failure 4+MR  YAMILE x2 to prox Cx  RCA non dominant  LAD ok      CARDIAC DEFIBRILLATOR PLACEMENT  2012    Replaced battery 5-3-18    Oswaldo Hoops CARDIAC PACEMAKER PLACEMENT  2010    Dual chamber Guidant Biv AICD, gen change BiV ICD [employer] 5/18    CORONARY ANGIOPLASTY WITH STENT PLACEMENT  2010    stenting to circumflex     CMP:   Lab Results   Component Value Date     04/25/2018    K 4 1 09/09/2020    K 4 1 04/25/2018     09/09/2020     04/25/2018    CO2 29 09/09/2020    CO2 33 (H) 04/25/2018    BUN 17 09/09/2020    BUN 23 04/25/2018    CREATININE 0 78 09/09/2020    CREATININE 0 77 04/25/2018    EGFR 75 09/09/2020       Lipid Profile:   Lab Results   Component Value Date    TRIG 113 09/09/2020    HDL 47 03/13/2020         Review of Systems   10  point ROS  was otherwise non pertinent or negative except as per HPI or as below  Gait: Slow        Objective:     /70   Pulse 64   Ht 5' 1" (1 549 m)   Wt 50 kg (110 lb 3 7 oz)   BMI 20 83 kg/m²     PHYSICAL EXAM:    General:  Normal appearance in no distress  Eyes:  Anicteric  Oral mucosa:  Moist   Neck:  No JVD  Carotid upstrokes are brisk without bruits  No masses  Chest:  Clear to auscultation and percussion  ICD left subclavian  Cardiac:  No  palpable PMI  Normal S1 and S2  No murmur gallop or rub  Abdomen:  Soft and nontender  No palpable organomegaly or aortic enlargement  Extremities:  No peripheral edema  Musculoskeletal:  Symmetric  Vascular:  Femoral pulses are brisk without bruits  Popliteal pulses are intact bilaterally  Pedal pulses are absent  Neuro:  Grossly symmetric  Psych:  Alert and oriented x3  Current Outpatient Medications:     alendronate (FOSAMAX) 70 mg tablet, Take 1 tablet (70 mg total) by mouth every 7 days STAY UPRIGHT FOR 30 MINUTES AFTER TAKING THIS PILL, Disp: 24 tablet, Rfl: 1    aspirin (ECOTRIN LOW STRENGTH) 81 mg EC tablet, Take 81 mg by mouth daily, Disp: , Rfl:     atorvastatin (LIPITOR) 80 mg tablet, Take 1 tablet (80 mg total) by mouth daily, Disp: 90 tablet, Rfl: 1    carvedilol (COREG) 3 125 mg tablet, TAKE (1) TABLET TWICE A DAY WITH MEALS, Disp: 180 tablet, Rfl: 0    FLUoxetine (PROzac) 20 mg capsule, TAKE (1) CAPSULE DAILY  , Disp: 30 capsule, Rfl: 0    furosemide (LASIX) 20 mg tablet, TAKE (1) TABLET DAILY  , Disp: 90 tablet, Rfl: 0    losartan (COZAAR) 50 mg tablet, Take 1 tablet (50 mg total) by mouth daily, Disp: 90 tablet, Rfl: 5    Magnesium 250 MG TABS, Take 2 tablets by mouth daily, Disp: , Rfl:     nitroglycerin (NITROSTAT) 0 4 mg SL tablet, Place 1 tablet (0 4 mg total) under the tongue as needed for chest pain, Disp: 25 tablet, Rfl: 5  Allergies   Allergen Reactions    Lisinopril      Patient states she is not allergic  Past Medical History:   Diagnosis Date    Age-related osteoporosis without current pathological fracture     Athscl heart disease of native coronary artery w/o ang pctrs     Body mass index (BMI) 20 0-20 9, adult     Boxy mass index 20-24 - normal     Carotid artery occlusion     Cerebral infarction ([facility] [address]     Essential (primary) hypertension     Hx of echocardiogram 07/31/2017    2D w/ CFD; EF0 15 (15%) mild LVH  Mild mitral and aortic regurgitation  Trace tricuspid regurgitation    / EF0 35 (35%) hypokinesis f anteroseptum, anterolateral wall, mid inferoseptum, and apex  Mildly dilated left atrium  Mild to moderate MR   Mild TR  7/11/17    Hypercholesterolemia     Ischemic cardiomyopathy     Mixed hyperlipidemia     Myocardial infarction ([address]     Nonrheumatic mitral (valve) insufficiency     Presence of automatic (implantable) cardiac defibrillator            Social History     Tobacco Use   Smoking Status Former Smoker   Smokeless Tobacco Never Used

## 2021-02-16 NOTE — ASSESSMENT & PLAN NOTE
Prior dominant Cx stent in 2010 2020 Dorethea Clink suggestive of prior anterior wall MI  No recent angina or NTG use  Continue current regimen

## 2021-02-22 ENCOUNTER — REMOTE DEVICE CLINIC VISIT (OUTPATIENT)
Dept: CARDIOLOGY CLINIC | Facility: CLINIC | Age: 76
End: 2021-02-22
Payer: COMMERCIAL

## 2021-02-22 DIAGNOSIS — I49.5 SICK SINUS SYNDROME (HCC): Primary | ICD-10-CM

## 2021-02-22 DIAGNOSIS — Z45.010 ENCOUNTER FOR CHECKING AND TESTING OF CARDIAC PACEMAKER PULSE GENERATOR (BATTERY): ICD-10-CM

## 2021-02-22 PROCEDURE — 93295 DEV INTERROG REMOTE 1/2/MLT: CPT | Performed by: INTERNAL MEDICINE

## 2021-02-22 PROCEDURE — 93296 REM INTERROG EVL PM/IDS: CPT | Performed by: INTERNAL MEDICINE

## 2021-02-27 DIAGNOSIS — F32.A DEPRESSION, UNSPECIFIED DEPRESSION TYPE: ICD-10-CM

## 2021-03-01 RX ORDER — FLUOXETINE HYDROCHLORIDE 20 MG/1
CAPSULE ORAL
Qty: 30 CAPSULE | Refills: 0 | Status: SHIPPED | OUTPATIENT
Start: 2021-03-01 | End: 2021-04-29

## 2021-03-05 NOTE — PROGRESS NOTES
[employer] remote check biv icd  8 mode switches longest 8 seconds  3 PMT noted  Normal battery function  Current Outpatient Medications:     alendronate (FOSAMAX) 70 mg tablet, Take 1 tablet (70 mg total) by mouth every 7 days STAY UPRIGHT FOR 30 MINUTES AFTER TAKING THIS PILL, Disp: 24 tablet, Rfl: 1    aspirin (ECOTRIN LOW STRENGTH) 81 mg EC tablet, Take 81 mg by mouth daily, Disp: , Rfl:     atorvastatin (LIPITOR) 80 mg tablet, Take 1 tablet (80 mg total) by mouth daily, Disp: 90 tablet, Rfl: 1    carvedilol (COREG) 3 125 mg tablet, TAKE (1) TABLET TWICE A DAY WITH MEALS, Disp: 180 tablet, Rfl: 0    FLUoxetine (PROzac) 20 mg capsule, TAKE (1) CAPSULE DAILY  , Disp: 30 capsule, Rfl: 0    furosemide (LASIX) 20 mg tablet, TAKE (1) TABLET DAILY  , Disp: 90 tablet, Rfl: 0    losartan (COZAAR) 50 mg tablet, Take 1 tablet (50 mg total) by mouth daily, Disp: 90 tablet, Rfl: 5    Magnesium 250 MG TABS, Take 2 tablets by mouth daily, Disp: , Rfl:     nitroglycerin (NITROSTAT) 0 4 mg SL tablet, Place 1 tablet (0 4 mg total) under the tongue as needed for chest pain, Disp: 25 tablet, Rfl: 5

## 2021-03-26 ENCOUNTER — APPOINTMENT (EMERGENCY)
Dept: RADIOLOGY | Facility: HOSPITAL | Age: 76
End: 2021-03-26
Payer: COMMERCIAL

## 2021-03-26 ENCOUNTER — HOSPITAL ENCOUNTER (EMERGENCY)
Facility: HOSPITAL | Age: 76
Discharge: HOME | End: 2021-03-26
Attending: FAMILY MEDICINE | Admitting: FAMILY MEDICINE
Payer: COMMERCIAL

## 2021-03-26 VITALS
OXYGEN SATURATION: 98 % | RESPIRATION RATE: 18 BRPM | DIASTOLIC BLOOD PRESSURE: 58 MMHG | TEMPERATURE: 97.4 F | HEART RATE: 62 BPM | WEIGHT: 110 LBS | BODY MASS INDEX: 20.77 KG/M2 | SYSTOLIC BLOOD PRESSURE: 115 MMHG | HEIGHT: 61 IN

## 2021-03-26 DIAGNOSIS — M25.562 ACUTE PAIN OF LEFT KNEE: Primary | ICD-10-CM

## 2021-03-26 PROCEDURE — 96372 THER/PROPH/DIAG INJ SC/IM: CPT

## 2021-03-26 PROCEDURE — 99284 EMERGENCY DEPT VISIT MOD MDM: CPT | Performed by: FAMILY MEDICINE

## 2021-03-26 PROCEDURE — 99283 EMERGENCY DEPT VISIT LOW MDM: CPT

## 2021-03-26 PROCEDURE — 73552 X-RAY EXAM OF FEMUR 2/>: CPT

## 2021-03-26 RX ORDER — TRAMADOL HYDROCHLORIDE 50 MG/1
50 TABLET ORAL EVERY 6 HOURS PRN
Qty: 6 TABLET | Refills: 0 | Status: SHIPPED | OUTPATIENT
Start: 2021-03-26 | End: 2021-03-29

## 2021-03-26 RX ORDER — TRAMADOL HYDROCHLORIDE 50 MG/1
50 TABLET ORAL ONCE
Status: COMPLETED | OUTPATIENT
Start: 2021-03-26 | End: 2021-03-26

## 2021-03-26 RX ORDER — DEXAMETHASONE SODIUM PHOSPHATE 4 MG/ML
8 INJECTION, SOLUTION INTRA-ARTICULAR; INTRALESIONAL; INTRAMUSCULAR; INTRAVENOUS; SOFT TISSUE ONCE
Status: COMPLETED | OUTPATIENT
Start: 2021-03-26 | End: 2021-03-26

## 2021-03-26 RX ORDER — HYDROCODONE BITARTRATE AND ACETAMINOPHEN 5; 325 MG/1; MG/1
1 TABLET ORAL ONCE
Status: COMPLETED | OUTPATIENT
Start: 2021-03-26 | End: 2021-03-26

## 2021-03-26 RX ADMIN — DEXAMETHASONE SODIUM PHOSPHATE 8 MG: 4 INJECTION, SOLUTION INTRA-ARTICULAR; INTRALESIONAL; INTRAMUSCULAR; INTRAVENOUS; SOFT TISSUE at 11:14

## 2021-03-26 RX ADMIN — HYDROCODONE BITARTRATE AND ACETAMINOPHEN 1 TABLET: 5; 325 TABLET ORAL at 10:01

## 2021-03-26 RX ADMIN — TRAMADOL HYDROCHLORIDE 50 MG: 50 TABLET, FILM COATED ORAL at 11:11

## 2021-03-26 NOTE — ED PROVIDER NOTES
History  Chief Complaint   Patient presents with    Leg Pain     According to the patient she has had left leg pain and swelling for the last week       History provided by:  Patient   used: No    This is a 52-year-old the female presented to ED with complain of left knee pain ongoing for a week and a half  Patient states that she has some trouble walking due to knee pain and has been applying icy Hot on her knee  Patient states she also has pain right above her knee however worse in the ED  Rating her pain as 6/10 at this time  She states that walking makes it worse and nothing makes it better  Patient states she has been taking Tylenol alternate ibuprofen with the some improvement  She denies any trauma or injury to her knee  Prior to Admission Medications   Prescriptions Last Dose Informant Patient Reported? Taking? FLUoxetine (PROzac) 20 mg capsule   No No   Sig: TAKE (1) CAPSULE DAILY  Magnesium 250 MG TABS   Yes No   Sig: Take 2 tablets by mouth daily   alendronate (FOSAMAX) 70 mg tablet   No No   Sig: Take 1 tablet (70 mg total) by mouth every 7 days STAY UPRIGHT FOR 30 MINUTES AFTER TAKING THIS PILL   aspirin (ECOTRIN LOW STRENGTH) 81 mg EC tablet   Yes No   Sig: Take 81 mg by mouth daily   atorvastatin (LIPITOR) 80 mg tablet   No No   Sig: Take 1 tablet (80 mg total) by mouth daily   carvedilol (COREG) 3 125 mg tablet   No No   Sig: TAKE (1) TABLET TWICE A DAY WITH MEALS   furosemide (LASIX) 20 mg tablet   No No   Sig: TAKE (1) TABLET DAILY     losartan (COZAAR) 50 mg tablet   No No   Sig: Take 1 tablet (50 mg total) by mouth daily   nitroglycerin (NITROSTAT) 0 4 mg SL tablet   No No   Sig: Place 1 tablet (0 4 mg total) under the tongue as needed for chest pain      Facility-Administered Medications: None       Past Medical History:   Diagnosis Date    Age-related osteoporosis without current pathological fracture     Athscl heart disease of native coronary artery w/o ang pctrs     Body mass index (BMI) 20 0-20 9, adult     Boxy mass index 20-24 - normal     Carotid artery occlusion     Cerebral infarction ([facility] [address] [facility]     Essential (primary) hypertension     Hx of echocardiogram 07/31/2017    2D w/ CFD; EF0 15 (15%) mild LVH  Mild mitral and aortic regurgitation  Trace tricuspid regurgitation    / EF0 35 (35%) hypokinesis f anteroseptum, anterolateral wall, mid inferoseptum, and apex  Mildly dilated left atrium  Mild to moderate MR  Mild TR  7/11/17    Hypercholesterolemia     Ischemic cardiomyopathy     Mixed hyperlipidemia     Myocardial infarction ([facility] [address] [facility]     Nonrheumatic mitral (valve) insufficiency     Presence of automatic (implantable) cardiac defibrillator        Past Surgical History:   Procedure Laterality Date    CARDIAC CATHETERIZATION  07/10/2017    High LVEDP  Severe biv failure 4+MR  YAMLIE x2 to prox Cx  RCA non dominant  LAD ok   CARDIAC DEFIBRILLATOR PLACEMENT  2012    Replaced battery 5-3-18    [facility] CARDIAC PACEMAKER PLACEMENT  2010    Dual chamber Guidant Biv AICD, gen change BiV ICD [employer] 5/18    CORONARY ANGIOPLASTY WITH STENT PLACEMENT  2010    stenting to circumflex       Family History   Problem Relation Age of Onset    Stroke Father         Cerebrovascular accident      I have reviewed and agree with the history as documented  E-Cigarette/Vaping    E-Cigarette Use Never User      E-Cigarette/Vaping Substances     Social History     Tobacco Use    Smoking status: Former Smoker    Smokeless tobacco: Never Used   Substance Use Topics    Alcohol use: Yes     Comment: Rarely     Drug use: Never       Review of Systems   Constitutional: Negative  HENT: Negative  Eyes: Negative  Respiratory: Negative  Cardiovascular: Negative  Gastrointestinal: Negative  Endocrine: Negative  Genitourinary: Negative  Musculoskeletal:        Left knee pain    Skin: Negative  Allergic/Immunologic: Negative  Neurological: Negative  Psychiatric/Behavioral: Negative  Physical Exam  Physical Exam  Vitals signs and nursing note reviewed  Constitutional:       Appearance: Normal appearance  HENT:      Head: Normocephalic and atraumatic  Nose: Nose normal    Eyes:      Extraocular Movements: Extraocular movements intact  Conjunctiva/sclera: Conjunctivae normal       Pupils: Pupils are equal, round, and reactive to light  Neck:      Musculoskeletal: Normal range of motion and neck supple  Cardiovascular:      Rate and Rhythm: Normal rate and regular rhythm  Pulmonary:      Effort: Pulmonary effort is normal       Breath sounds: Normal breath sounds  Abdominal:      General: Bowel sounds are normal  There is no distension  Palpations: Abdomen is soft  Musculoskeletal:         General: Tenderness (left knee: mild tenderness to palpation  No swelling   ROM intact no leg swelling is noted ) present  Skin:     General: Skin is warm  Neurological:      General: No focal deficit present  Mental Status: She is alert and oriented to person, place, and time           Vital Signs  ED Triage Vitals   Temperature Pulse Respirations Blood Pressure SpO2   03/26/21 0929 03/26/21 0929 03/26/21 0929 03/26/21 0929 03/26/21 0929   (!) 97 4 °F (36 3 °C) 61 18 97/55 96 %      Temp Source Heart Rate Source Patient Position - Orthostatic VS BP Location FiO2 (%)   03/26/21 0929 03/26/21 0929 03/26/21 0929 03/26/21 0929 --   Temporal Monitor Lying Left arm       Pain Score       03/26/21 1001       Worst Possible Pain           Vitals:    03/26/21 0929 03/26/21 1119   BP: 97/55 115/58   Pulse: 61 62   Patient Position - Orthostatic VS: Lying Lying         Visual Acuity      ED Medications  Medications   HYDROcodone-acetaminophen (NORCO) 5-325 mg per tablet 1 tablet (1 tablet Oral Given 3/26/21 1001)   traMADol (ULTRAM) tablet 50 mg (50 mg Oral Given 3/26/21 1111)   dexamethasone (DECADRON) injection 8 mg (8 mg Intramuscular Given 3/26/21 1114)       Diagnostic Studies  Results Reviewed     None                 XR femur 2 views LEFT   Final Result by Hedy Monsalve MD (03/26 1029)      No acute osseous abnormality  Workstation performed: [id number]                    Procedures  Procedures         ED Course  ED Course as of Mar 26 1326   Fri Mar 26, 2021   1038  Left Femur x-ray: Arthritis with medial compartment chondrocalcinosis of left knee noted                                              MDM  Number of Diagnoses or Management Options  Acute pain of left knee:   Diagnosis management comments: Patient states that she felt better after tramadol will give her prescription  Recommended to follow-up with orthopedic and physical therapy  X-ray shows chondrocalcinosis no fractures noted  Ambulatory in the ED  Patient is also placed on knee immobilizer  Recommend to return to the ED symptom does not improve or worsen  Disposition  Final diagnoses:   Acute pain of left knee     Time reflects when diagnosis was documented in both MDM as applicable and the Disposition within this note     Time User Action Codes Description Comment    3/26/2021 10:53 AM Abena Rivera Add [M25 562] Acute pain of left knee       ED Disposition     ED Disposition Condition Date/Time Comment    Discharge Stable Fri Mar 26, 2021 11:26 AM Nilsa Moyer discharge to home/self care              Follow-up Information     Follow up With Specialties Details Why Contact Info    Cindy Cummings DO Family Medicine Schedule an appointment as soon as possible for a visit in 2 days If symptoms worsen [address]  [phone]            Discharge Medication List as of 3/26/2021 11:29 AM      START taking these medications    Details   traMADol (ULTRAM) 50 mg tablet Take 1 tablet (50 mg total) by mouth every 6 (six) hours as needed for moderate pain for up to 3 days, Starting Fri 3/26/2021, Until Mon 3/29/2021, Normal CONTINUE these medications which have NOT CHANGED    Details   alendronate (FOSAMAX) 70 mg tablet Take 1 tablet (70 mg total) by mouth every 7 days STAY UPRIGHT FOR 30 MINUTES AFTER TAKING THIS PILL, Starting Tue 11/17/2020, Normal      aspirin (ECOTRIN LOW STRENGTH) 81 mg EC tablet Take 81 mg by mouth daily, Historical Med      atorvastatin (LIPITOR) 80 mg tablet Take 1 tablet (80 mg total) by mouth daily, Starting Tue 12/29/2020, Normal      carvedilol (COREG) 3 125 mg tablet TAKE (1) TABLET TWICE A DAY WITH MEALS, Normal      FLUoxetine (PROzac) 20 mg capsule TAKE (1) CAPSULE DAILY , Normal      furosemide (LASIX) 20 mg tablet TAKE (1) TABLET DAILY , Normal      losartan (COZAAR) 50 mg tablet Take 1 tablet (50 mg total) by mouth daily, Starting Thu 2/4/2021, Normal      Magnesium 250 MG TABS Take 2 tablets by mouth daily, Historical Med      nitroglycerin (NITROSTAT) 0 4 mg SL tablet Place 1 tablet (0 4 mg total) under the tongue as needed for chest pain, Starting Mon 8/24/2020, Normal               PDMP Review     None          ED Provider  Electronically Signed by           Lidia Ohara MD  03/26/21 8227

## 2021-03-30 ENCOUNTER — RA CDI HCC (OUTPATIENT)
Dept: OTHER | Facility: HOSPITAL | Age: 76
End: 2021-03-30

## 2021-03-30 NOTE — PROGRESS NOTES
PT Evaluation  and PT Discharge    Today's date: 3/31/2021  Patient name: Rosa Fischer  : 1945  MRN: [id number]  Referring provider: Wojciech Hernandez PT  Dx:   Encounter Diagnosis     ICD-10-CM    1  Acute pain of left knee  M25 562                   Assessment  Assessment details: Rosa Fischer is a 77 y o  female with a history of HTN, LBBB, Hx MI, CVA, PVD, ischemic CM, (+) cardiac defibrillator/pacer, Mitral valve disorder, osteoporosis, CKD, arthropathy, depression, and insomnia that presents for a moderate complexity direct access physical therapy initial evaluation  The patient demonstrates signs and symptoms consistent with acute L knee pain / questionable left rectus femoris tear/tendinopathy  During the examination the patient demonstrated decreased L LE quadriceps and hip flexor strength, decreased L knee AROM, gait dysfunction, and L knee pain with leg lifts and knee extension  The patient's impairments are causing the following functional limitations: difficulty with prolonged standing, prolonged walking, walking on unlevel surfaces, difficulty squatting/kneeling, difficulty stair-climbing, and difficulty transferring from low surfaces  The patient's clinical presentation is evolving due to a number of participation restrictions, significant medial history, and functional limitation (FOTO 61% function)  The patient will benefit from skilled PT services to address impairments, work towards goals, and restore PLOF  ADDENDUM: 2021: Rosa Fischer is a 77 y o  female that has attended 1 sessions of physical therapy will be discharged from formal PT at this time  The patient had difficulty attending therapy due to self discharge  The patient was given home exercise program information during the formal therapy sessions and is able to continue these post discharge  NO NEW OBJECTIVE MEASURES WERE COMPLETED  D/C FORMAL PT      Impairments: abnormal or restricted ROM, activity intolerance, impaired balance, impaired physical strength, lacks appropriate home exercise program, pain with function and poor posture   Functional limitations: difficulty with prolonged standing, prolonged walking, walking on unlevel surfaces, difficulty squatting/kneeling, difficulty stair-climbing, and difficulty transferring from low surfaces  Barriers to therapy: SIGNIFICANT MEDICAL HX  Understanding of Dx/Px/POC: fair   Prognosis: fair  Prognosis details: SIGNIFICANT MEDICAL HX    Goals  ALL GOALS N/A DUE TO SELF DISCHARGE    STG: Achieve in 4-6 weeks  1  Patient's L knee pain at worst less than 2/10 to allow for proper gait  2   Patient's L knee ROM improve by 20-45 degrees to improve squatting  3   L LE MMT improve to > 4+/5 all motions tested to improve ADL/recreational activities  4   Timed up and go score improve to less than 14 seconds to indicate improved balance/decreased falls risk    LTG:  Achieve in 6-12 weeks  1  Patient's knee FOTO score improve to > 61% to indicate a return to normal functioning  2   Patient achieve personal goal of getting up to use the bathroom/from the bathroom without L LE pain  3  Patient to achieve independence with home exercise plan  Plan  Plan details: RE-ASSESS 1X/MONTH - Patient advised to continue with the L knee immobilizer due to instability with walking without along with quadriceps weakness / falls risk  ADDENDUM: 5/5/2021: Evangelista Castellon is a 77 y o  female that has attended 1 sessions of physical therapy will be discharged from formal PT at this time  The patient had difficulty attending therapy due to self discharge  The patient was given home exercise program information during the formal therapy sessions and is able to continue these post discharge  NO NEW OBJECTIVE MEASURES WERE COMPLETED  D/C FORMAL PT    Patient would benefit from: skilled physical therapy  Planned modality interventions: TENS, cryotherapy, thermotherapy: hydrocollator packs, ultrasound and unattended electrical stimulation  Other planned modality interventions: IAS  Planned therapy interventions: joint mobilization, manual therapy, massage, neuromuscular re-education, patient education, postural training, self care, strengthening, stretching, therapeutic activities, therapeutic exercise, home exercise program, abdominal trunk stabilization, balance, gait training and balance/weight bearing training  Frequency: 1-3X/WK  Duration in weeks: 12  Plan of Care beginning date: 3/31/2021  Plan of Care expiration date: 2021  Treatment plan discussed with: PTA and patient        Subjective Evaluation    History of Present Illness  Date of onset: 3/18/2021  Mechanism of injury: ADDENDUM: 2021: Jacqueline Garcia is a 77 y o  female that has attended 1 sessions of physical therapy will be discharged from formal PT at this time  The patient had difficulty attending therapy due to self discharge  The patient was given home exercise program information during the formal therapy sessions and is able to continue these post discharge  NO NEW OBJECTIVE MEASURES WERE COMPLETED  D/C FORMAL PT         INJURY HISTORY: Jacqueline Garcia is a 77 y o  female that presents to outpatient physical therapy with complaints of left superior knee pain  The patient reports insidious onset of right anterior thigh pain with  The patient tried to let the pain improve for a few days before going to the ED on 3/26/2021  The patient received a femur xray which was (-) bony abnormalities  The patient was given a knee immobilizer and instructed to wear at all times except when showering  The patient presents to outpatient PT for evaluation  The patient's main goal for physical therapy is to decrease her left superior knee pain when getting up from going to the batrhroom       Pain  Current pain ratin  At best pain ratin  At worst pain ratin  Location: L KNEE  Quality: discomfort  Aggravating factors: sitting  Progression: no change    Social Support  Steps to enter house: yes  4  Stairs in house: no   Lives in: [place]  Lives with: alone    Employment status: not working  Hand dominance: right    Treatments  Current treatment: immobilization and physical therapy  Patient Goals  Patient goals for therapy: decreased pain, increased motion, increased strength, improved balance, independence with ADLs/IADLs and return to sport/leisure activities  Patient goal: get up to use the bathroom without left knee pain        Objective     Observations     Additional Observation Details  No obvious edema, skin discoloration L anterior thigh    Palpation     Additional Palpation Details  No tenderness to touch    Tenderness     Additional Tenderness Details  NO TENDERNESS NOTED    Neurological Testing     Sensation     Knee   Left Knee   Intact: light touch    Right Knee   Intact: light touch     Active Range of Motion   Left Knee   Flexion: 100 degrees with pain  Extension: 0 degrees     Right Knee   Normal active range of motion  Flexion: 130 degrees   Extensor la degrees     Passive Range of Motion   Left Knee   Flexion: 130 degrees   Extension: 2 degrees     Right Knee   Flexion: 130 degrees   Extension: 2 degrees     Mobility   Patellar Mobility:   Left Knee   Hypomobile: left medial, left lateral, left superior and left inferior    Strength/Myotome Testing     Left Hip   Planes of Motion   Flexion: 3- (PAIN)  Extension: 4-  Abduction: 3+  Adduction: 3+    Right Hip   Planes of Motion   Flexion: 5  Extension: 5  Abduction: 5  Adduction: 5    Left Knee   Flexion: 3+  Extension: 3+  Quadriceps contraction: poor    Right Knee   Flexion: 5  Extension: 5  Quadriceps contraction: fair    Tests     Left Knee   Negative anterior drawer, anterior Lachman, posterior drawer, valgus stress test at 0 degrees, valgus stress test at 30 degrees, varus stress test at 0 degrees and varus stress test at 30 degrees  Additional Tests Details  FOTO: 42% ( predicted 61%)    Gait impairments: Patient ambulates with no AD WBAT L LE  The patient is wearing a left knee immobilizer  The patient demonstrates slow gait speed, unequal step lengths, decreased left knee flexion throughout the gait cycle, poor foot clearance, and decreased heel-toe rollover  Swelling     Left Knee Girth Measurement (cm)   Joint line: 27 cm  10 cm above joint line: 32 cm    Right Knee Girth Measurement (cm)   Joint line: 27 cm  10 cm above joint line: 31 5 cm               Re-Evaluation:4/29  PRECAUTIONS: CARDIAC /  + PACER  /FALLS / OSTEOPOROSIS  Knee Specialty Daily Treatment Diary     Manual Therapy 3/31       MFR/STM/ IASTM        Hamstring stretch        Prone Quad stretch        Patellar mobs        Knee stretch on edge of mat            Ther Ex 3/31       NU STEP  *      Heel slides flex/abd  *      PROM knee        EXT BOARD        Prone knee flex        Ball squeeze  *      SLR all planes        SAQ  *      LAQ        Hamstring stretch        Calf stretch        Standing hamstring curls        T-band abduct  *      Mini Squat                TKE        Total gym squats (deep)        Neuro Re-ed        SLB        QS x15       Wall slides with feet stagge        Gluteal sets x15       Fitter board        Eccentric Leg press        Clam Shells  *      QS+ SLR        Conley        GAIT Training        sidestepping  *      Heel-toe amb        Mirror walking        Ther Act        Amb up/down steps        Chair squats        Crate carry        Step ups            Modalities 3/31       CP  KNEE x10 mins                               The patient was given a new home exercise plan with written handout, pictures, and verbal instruction  The patient accepts and understands the new home activities    [id number]

## 2021-03-30 NOTE — PROGRESS NOTES
[place] [address]  coding oppertunities             Chart reviewed, (number of) suggestions sent to provider: 1     Problem listed updated   Provider Accepted, (number of) suggestions accepted: 1              [place] [address]  coding oppertunities             Chart reviewed, (number of) suggestions sent to provider: 1      Can the depression be further specified as:     F32 0 major depressive disorder, single episode, mild  OR   F32 1 major depressive disorder, single episode, moderate  OR   F32 2 major depressive disorder, single episode, severe without psychotic features OR   F32 4 major depressive disorder, single episode, in partial remission OR   F32 5 major depressive disorder, single episode, in full remission

## 2021-03-31 ENCOUNTER — OFFICE VISIT (OUTPATIENT)
Dept: PHYSICAL THERAPY | Facility: CLINIC | Age: 76
End: 2021-03-31
Payer: COMMERCIAL

## 2021-03-31 DIAGNOSIS — M25.562 ACUTE PAIN OF LEFT KNEE: Primary | ICD-10-CM

## 2021-03-31 PROBLEM — F32.0 MAJOR DEPRESSIVE DISORDER, SINGLE EPISODE, MILD (HCC): Status: ACTIVE | Noted: 2020-09-10

## 2021-03-31 PROCEDURE — 97162 PT EVAL MOD COMPLEX 30 MIN: CPT | Performed by: PHYSICAL THERAPIST

## 2021-03-31 PROCEDURE — 97112 NEUROMUSCULAR REEDUCATION: CPT | Performed by: PHYSICAL THERAPIST

## 2021-04-05 ENCOUNTER — OFFICE VISIT (OUTPATIENT)
Dept: FAMILY MEDICINE CLINIC | Facility: CLINIC | Age: 76
End: 2021-04-05
Payer: COMMERCIAL

## 2021-04-05 VITALS
RESPIRATION RATE: 18 BRPM | HEIGHT: 61 IN | BODY MASS INDEX: 20.39 KG/M2 | DIASTOLIC BLOOD PRESSURE: 70 MMHG | SYSTOLIC BLOOD PRESSURE: 112 MMHG | OXYGEN SATURATION: 98 % | TEMPERATURE: 98.2 F | WEIGHT: 108 LBS | HEART RATE: 82 BPM

## 2021-04-05 DIAGNOSIS — M25.562 ACUTE PAIN OF LEFT KNEE: ICD-10-CM

## 2021-04-05 DIAGNOSIS — M79.605 LEFT LEG PAIN: Primary | ICD-10-CM

## 2021-04-05 PROCEDURE — 99213 OFFICE O/P EST LOW 20 MIN: CPT | Performed by: FAMILY MEDICINE

## 2021-04-05 NOTE — PROGRESS NOTES
Assessment/Plan:       Problem List Items Addressed This Visit     None      Visit Diagnoses     Left leg pain    -  Primary    Relevant Orders    Ambulatory referral to Sports Medicine    Acute pain of left knee        Relevant Orders    Ambulatory referral to Sports Medicine            Subjective:      Patient ID: Callie Loving is a 77 y o  female  HPI     Left leg/knee pain- 3/26 she was seen in the ED for left knee pain  XR femur left showed no acute osseous abnormality, arthritis with medial compartment chondrocalcinosis  She was referred to PT and orthopedics, did meet with physical therapy recently  She was discharged with a short course of Tramadol 50 mg every 6 hours PRN  She was instructed to use a knee immobilizer  Interval history- Continued pain left thigh to knee, able to ambulate without pain but painful after walking/use  She is interested in a corticosteroid injection  I advised we can have her follow-up with sports medicine/orthopedic surgery for further evaluation/possible corticosteroid injection  No new onset back pain, no other complaints  The following portions of the patient's history were reviewed and updated as appropriate: allergies, current medications, past family history, past medical history, past social history, past surgical history and problem list     Review of Systems   Musculoskeletal: Positive for arthralgias  Negative for back pain and joint swelling  Skin: Negative for color change, rash and wound  Objective:      /70   Pulse 82   Temp 98 2 °F (36 8 °C)   Resp 18   Ht 5' 1" (1 549 m)   Wt 49 kg (108 lb)   SpO2 98%   BMI 20 41 kg/m²          Physical Exam  Vitals signs reviewed  Constitutional:       General: She is not in acute distress  Appearance: Normal appearance  She is not ill-appearing, toxic-appearing or diaphoretic  Pulmonary:      Effort: Pulmonary effort is normal  No respiratory distress     Musculoskeletal:         General: Tenderness present  No swelling, deformity or signs of injury  Left knee: She exhibits normal range of motion, no swelling, no effusion, no deformity, no erythema, normal alignment, no LCL laxity, normal patellar mobility, no bony tenderness and no MCL laxity  No tenderness found  No medial joint line, no lateral joint line, no MCL, no LCL and no patellar tendon tenderness noted  Right lower leg: No edema  Left lower leg: No edema  Legs:    Skin:     General: Skin is warm  Coloration: Skin is not pale  Findings: No erythema or rash  Neurological:      Mental Status: She is alert and oriented to person, place, and time  Sensory: Sensation is intact  No sensory deficit  Motor: Weakness present  Deep Tendon Reflexes:      Reflex Scores:       Patellar reflexes are 2+ on the right side and 2+ on the left side  Comments: 4/5 weakness with hip flexion (difficult exam/limited by pain), otherwise 5/5 strength      Psychiatric:         Mood and Affect: Mood normal          Behavior: Behavior normal            DO Ra Pickard [address] [place] [address] Primary Care

## 2021-04-06 ENCOUNTER — OFFICE VISIT (OUTPATIENT)
Dept: OBGYN CLINIC | Facility: CLINIC | Age: 76
End: 2021-04-06
Payer: COMMERCIAL

## 2021-04-06 VITALS
BODY MASS INDEX: 20.39 KG/M2 | SYSTOLIC BLOOD PRESSURE: 92 MMHG | WEIGHT: 108 LBS | HEART RATE: 62 BPM | DIASTOLIC BLOOD PRESSURE: 60 MMHG | HEIGHT: 61 IN

## 2021-04-06 DIAGNOSIS — M17.12 PRIMARY OSTEOARTHRITIS OF LEFT KNEE: Primary | ICD-10-CM

## 2021-04-06 DIAGNOSIS — M25.562 ACUTE PAIN OF LEFT KNEE: ICD-10-CM

## 2021-04-06 DIAGNOSIS — S76.112A QUADRICEPS STRAIN, LEFT, INITIAL ENCOUNTER: ICD-10-CM

## 2021-04-06 DIAGNOSIS — M79.605 LEFT LEG PAIN: ICD-10-CM

## 2021-04-06 PROCEDURE — 99204 OFFICE O/P NEW MOD 45 MIN: CPT | Performed by: FAMILY MEDICINE

## 2021-04-06 PROCEDURE — 20610 DRAIN/INJ JOINT/BURSA W/O US: CPT | Performed by: FAMILY MEDICINE

## 2021-04-06 RX ORDER — METHYLPREDNISOLONE ACETATE 40 MG/ML
1 INJECTION, SUSPENSION INTRA-ARTICULAR; INTRALESIONAL; INTRAMUSCULAR; SOFT TISSUE
Status: COMPLETED | OUTPATIENT
Start: 2021-04-06 | End: 2021-04-06

## 2021-04-06 RX ORDER — LIDOCAINE HYDROCHLORIDE 10 MG/ML
4 INJECTION, SOLUTION INFILTRATION; PERINEURAL
Status: COMPLETED | OUTPATIENT
Start: 2021-04-06 | End: 2021-04-06

## 2021-04-06 RX ADMIN — LIDOCAINE HYDROCHLORIDE 4 ML: 10 INJECTION, SOLUTION INFILTRATION; PERINEURAL at 13:39

## 2021-04-06 RX ADMIN — METHYLPREDNISOLONE ACETATE 1 ML: 40 INJECTION, SUSPENSION INTRA-ARTICULAR; INTRALESIONAL; INTRAMUSCULAR; SOFT TISSUE at 13:39

## 2021-04-06 NOTE — LETTER
April 6, 2021     Alejandra Baron, [address] [place] [address]    Patient: Tera Schaeffer   YOB: 1945   Date of Visit: 4/6/2021       Dear Dr Lincoln Rosario: Thank you for referring Dain Calvillo to me for evaluation  Below are my notes for this consultation  If you have questions, please do not hesitate to call me  I look forward to following your patient along with you  Sincerely,        Deb Johnston MD        CC: No Recipients  Deb Johnston MD  4/6/2021  1:45 PM  [facility] [address]  [address]  [address] [place] [address]  [phone]      Chief Complaint:  Chief Complaint   Patient presents with    Left Knee - Pain       Vitals:  BP 92/60   Pulse 62   Ht 5' 1" (1 549 m)   Wt 49 kg (108 lb)   BMI 20 41 kg/m²     The following portions of the patient's history were reviewed and updated as appropriate: allergies, current medications, past family history, past medical history, past social history, past surgical history, and problem list       Subjective:   Patient ID: Tera Schaeffer is a 77 y o  female  Here c/o L knee pain  She was recently seen in ER- notes reviewd- XR done  Seen by PCP- notes reviewd  Referred to Ortho  She is having a lot of pain  Hurts to walk  Constant/sharp pain  No swelling  Denies locking or giving out  Taking tylenol- not much help  Better at rest   Pain started 3/26/21  XR of femur done  Denies trauma  Given boot- stopped using it  Review of Systems   Constitutional: Negative for fatigue and fever  Respiratory: Negative for shortness of breath  Cardiovascular: Negative for chest pain  Gastrointestinal: Negative for abdominal pain and nausea  Genitourinary: Negative for dysuria  Musculoskeletal: Positive for arthralgias  Skin: Negative for rash and wound  Neurological: Negative for weakness and headaches         Objective:  Left Knee Exam Tenderness   The patient is experiencing no tenderness  Range of Motion   Extension: normal   Flexion: normal     Tests   Audra:  Medial - negative Lateral - negative    Other   Swelling: none  Effusion: no effusion present    Comments:  Pain with resistance to knee ext the second time I examined the patient  5 min earlier- no pain with resistance to knee ext  TTP is distal 1/3 femur          Observations   Left Knee   Negative for effusion  Physical Exam  Vitals signs and nursing note reviewed  Constitutional:       Appearance: Normal appearance  She is well-developed  HENT:      Head: Normocephalic  Mouth/Throat:      Mouth: Mucous membranes are moist    Eyes:      Extraocular Movements: Extraocular movements intact  Neck:      Musculoskeletal: Normal range of motion  Cardiovascular:      Rate and Rhythm: Normal rate and regular rhythm  Heart sounds: Normal heart sounds  Pulmonary:      Effort: Pulmonary effort is normal       Breath sounds: Normal breath sounds  Abdominal:      General: Bowel sounds are normal       Palpations: Abdomen is soft  Musculoskeletal: Normal range of motion  General: Tenderness present  No swelling  Left knee: She exhibits no effusion  Skin:     General: Skin is warm and dry  Neurological:      General: No focal deficit present  Mental Status: She is alert and oriented to person, place, and time  Psychiatric:         Mood and Affect: Mood normal          Behavior: Behavior normal          Thought Content: Thought content normal      Large joint arthrocentesis: L knee  Universal Protocol:  Consent: Verbal consent obtained  Risks and benefits: risks, benefits and alternatives were discussed  Consent given by: patient  Time out: Immediately prior to procedure a "time out" was called to verify the correct patient, procedure, equipment, support staff and site/side marked as required    Timeout called at: 4/6/2021 1:40 PM   Site marked: the operative site was marked  Supporting Documentation  Indications: pain   Procedure Details  Location: knee - L knee  Preparation: Patient was prepped and draped in the usual sterile fashion  Needle size: 25 G  Ultrasound guidance: no  Approach: anterolateral  Medications administered: 4 mL lidocaine 1 %; 1 mL methylPREDNISolone acetate 40 mg/mL    Patient tolerance: patient tolerated the procedure well with no immediate complications  Dressing:  Sterile dressing applied            I have personally reviewed pertinent films in PACS and my interpretation is XR-  L knee- mod DJD, dec joint space medially  Assessment/Plan:  Assessment/Plan   Diagnoses and all orders for this visit:    Primary osteoarthritis of left knee  -     Ambulatory referral to Physical Therapy; Future    Left leg pain  -     Ambulatory referral to Sports Medicine  -     Ambulatory referral to Physical Therapy; Future    Acute pain of left knee  -     Ambulatory referral to Sports Medicine  -     XR knee 3 vw left non injury; Future  -     Ambulatory referral to Physical Therapy; Future    Quadriceps strain, left, initial encounter  -     Ambulatory referral to Physical Therapy; Future    Other orders  -     Large joint arthrocentesis: L knee        Return in about 2 weeks (around 4/20/2021) for Recheck       MD Rohit Shirley MD  4/6/2021  1:39 PM  [facility] [place]  [address]  [facility] [address]  [phone]      Chief Complaint:  Chief Complaint   Patient presents with    Left Knee - Pain       Vitals:  BP 92/60   Pulse 62   Ht 5' 1" (1 549 m)   Wt 49 kg (108 lb)   BMI 20 41 kg/m²     The following portions of the patient's history were reviewed and updated as appropriate: allergies, current medications, past family history, past medical history, past social history, past surgical history, and problem list       Subjective: Patient ID: Aysha Tsang is a 77 y o  female  Here c/o L knee pain  She was recently seen in ER- notes reviewd- XR done  Seen by PCP- notes reviewd  Referred to Ortho  She is having a lot of pain  Hurts to walk  Constant/sharp pain  No swelling  Denies locking or giving out  Taking tylenol- not much help  Better at rest   Pain started 3/26/21  XR of femur done  Denies trauma      Review of Systems   Constitutional: Negative for fatigue and fever  Respiratory: Negative for shortness of breath  Cardiovascular: Negative for chest pain  Gastrointestinal: Negative for abdominal pain and nausea  Genitourinary: Negative for dysuria  Musculoskeletal: Positive for arthralgias  Skin: Negative for rash and wound  Neurological: Negative for weakness and headaches  Objective:  Left Knee Exam     Tenderness   The patient is experiencing no tenderness  Range of Motion   Extension: normal   Flexion: normal     Tests   Audra:  Medial - negative Lateral - negative    Other   Swelling: none  Effusion: no effusion present    Comments:  Pain with resistance to knee ext the second time I examined the patient  5 min earlier- no pain with resistance to knee ext  TTP is distal 1/3 femur          Observations   Left Knee   Negative for effusion  Physical Exam  Vitals signs and nursing note reviewed  Constitutional:       Appearance: Normal appearance  She is well-developed  HENT:      Head: Normocephalic  Mouth/Throat:      Mouth: Mucous membranes are moist    Eyes:      Extraocular Movements: Extraocular movements intact  Neck:      Musculoskeletal: Normal range of motion  Cardiovascular:      Rate and Rhythm: Normal rate and regular rhythm  Heart sounds: Normal heart sounds  Pulmonary:      Effort: Pulmonary effort is normal       Breath sounds: Normal breath sounds  Abdominal:      General: Bowel sounds are normal       Palpations: Abdomen is soft     Musculoskeletal: Normal range of motion  General: Tenderness present  No swelling  Left knee: She exhibits no effusion  Skin:     General: Skin is warm and dry  Neurological:      General: No focal deficit present  Mental Status: She is alert and oriented to person, place, and time  Psychiatric:         Mood and Affect: Mood normal          Behavior: Behavior normal          Thought Content: Thought content normal      Large joint arthrocentesis: L knee  Universal Protocol:  Consent: Verbal consent obtained  Risks and benefits: risks, benefits and alternatives were discussed  Consent given by: patient  Time out: Immediately prior to procedure a "time out" was called to verify the correct patient, procedure, equipment, support staff and site/side marked as required  Timeout called at: 4/6/2021 1:34 PM   Site marked: the operative site was marked  Supporting Documentation  Indications: pain   Procedure Details  Location: knee - L knee  Preparation: Patient was prepped and draped in the usual sterile fashion  Needle size: 25 G  Ultrasound guidance: no  Approach: anterolateral  Medications administered: 4 mL lidocaine 1 %; 1 mL methylPREDNISolone acetate 40 mg/mL    Patient tolerance: patient tolerated the procedure well with no immediate complications  Dressing:  Sterile dressing applied            I have personally reviewed pertinent films in PACS and my interpretation is XR-  L knee- mod DJD, dec joint space medially  Assessment/Plan:  Assessment/Plan   Diagnoses and all orders for this visit:    Left leg pain  -     Ambulatory referral to Sports Medicine    Acute pain of left knee  -     Ambulatory referral to Sports Medicine  -     XR knee 3 vw left non injury; Future    Other orders  -     Large joint arthrocentesis        No follow-ups on file       Rosa Stevenson MD

## 2021-04-06 NOTE — PROGRESS NOTES
[facility] [place]  [address]  [address] [facility] [address] [phone]      Chief Complaint:  Chief Complaint   Patient presents with    Left Knee - Pain       Vitals:  BP 92/60   Pulse 62   Ht 5' 1" (1 549 m)   Wt 49 kg (108 lb)   BMI 20 41 kg/m²     The following portions of the patient's history were reviewed and updated as appropriate: allergies, current medications, past family history, past medical history, past social history, past surgical history, and problem list       Subjective:   Patient ID: Ellen Fishman is a 77 y o  female  Here c/o L knee pain  She was recently seen in ER- notes reviewd- XR done  Seen by PCP- notes reviewd  Referred to Ortho  She is having a lot of pain  Hurts to walk  Constant/sharp pain  No swelling  Denies locking or giving out  Taking tylenol- not much help  Better at rest   Pain started 3/26/21  XR of femur done  Denies trauma  Given boot- stopped using it  Review of Systems   Constitutional: Negative for fatigue and fever  Respiratory: Negative for shortness of breath  Cardiovascular: Negative for chest pain  Gastrointestinal: Negative for abdominal pain and nausea  Genitourinary: Negative for dysuria  Musculoskeletal: Positive for arthralgias  Skin: Negative for rash and wound  Neurological: Negative for weakness and headaches  Objective:  Left Knee Exam     Tenderness   The patient is experiencing no tenderness  Range of Motion   Extension: normal   Flexion: normal     Tests   Audra:  Medial - negative Lateral - negative    Other   Swelling: none  Effusion: no effusion present    Comments:  Pain with resistance to knee ext the second time I examined the patient  5 min earlier- no pain with resistance to knee ext  TTP is distal 1/3 femur          Observations   Left Knee   Negative for effusion  Physical Exam  Vitals signs and nursing note reviewed     Constitutional: Appearance: Normal appearance  She is well-developed  HENT:      Head: Normocephalic  Mouth/Throat:      Mouth: Mucous membranes are moist    Eyes:      Extraocular Movements: Extraocular movements intact  Neck:      Musculoskeletal: Normal range of motion  Cardiovascular:      Rate and Rhythm: Normal rate and regular rhythm  Heart sounds: Normal heart sounds  Pulmonary:      Effort: Pulmonary effort is normal       Breath sounds: Normal breath sounds  Abdominal:      General: Bowel sounds are normal       Palpations: Abdomen is soft  Musculoskeletal: Normal range of motion  General: Tenderness present  No swelling  Left knee: She exhibits no effusion  Skin:     General: Skin is warm and dry  Neurological:      General: No focal deficit present  Mental Status: She is alert and oriented to person, place, and time  Psychiatric:         Mood and Affect: Mood normal          Behavior: Behavior normal          Thought Content: Thought content normal      Large joint arthrocentesis: L knee  Universal Protocol:  Consent: Verbal consent obtained  Risks and benefits: risks, benefits and alternatives were discussed  Consent given by: patient  Time out: Immediately prior to procedure a "time out" was called to verify the correct patient, procedure, equipment, support staff and site/side marked as required    Timeout called at: 4/6/2021 1:40 PM   Site marked: the operative site was marked  Supporting Documentation  Indications: pain   Procedure Details  Location: knee - L knee  Preparation: Patient was prepped and draped in the usual sterile fashion  Needle size: 25 G  Ultrasound guidance: no  Approach: anterolateral  Medications administered: 4 mL lidocaine 1 %; 1 mL methylPREDNISolone acetate 40 mg/mL    Patient tolerance: patient tolerated the procedure well with no immediate complications  Dressing:  Sterile dressing applied            I have personally reviewed pertinent films in PACS and my interpretation is XR-  L knee- mod DJD, dec joint space medially  Assessment/Plan:  Assessment/Plan   Diagnoses and all orders for this visit:    Primary osteoarthritis of left knee  -     Ambulatory referral to Physical Therapy; Future    Left leg pain  -     Ambulatory referral to Sports Medicine  -     Ambulatory referral to Physical Therapy; Future    Acute pain of left knee  -     Ambulatory referral to Sports Medicine  -     XR knee 3 vw left non injury; Future  -     Ambulatory referral to Physical Therapy; Future    Quadriceps strain, left, initial encounter  -     Ambulatory referral to Physical Therapy; Future    Other orders  -     Large joint arthrocentesis: L knee        Return in about 2 weeks (around 4/20/2021) for Recheck       Rene Rodrigues MD

## 2021-04-06 NOTE — PATIENT INSTRUCTIONS
F/u 2 wks  Begin physical therapy- unsure of cause of pain  Pain comes and goes  XR does show OA  Possible quad strain vs knee OA  Steroid injection helped a little? Icing/OTC pain meds as needed

## 2021-04-08 ENCOUNTER — TELEPHONE (OUTPATIENT)
Dept: FAMILY MEDICINE CLINIC | Facility: CLINIC | Age: 76
End: 2021-04-08

## 2021-04-08 NOTE — TELEPHONE ENCOUNTER
Pt called stating when she was here on 4/5/21 to see you, that you were going to send in a refill of tramadol 50 mg for her? Was that something you want to rx for her? I see that was rx by the ED when she was seen for knee pain but am not sure if that is something you want to continue  If so, she would like refills sent to [facility]   Pt states she is having a good deal of pain in her knees      Please advise

## 2021-04-08 NOTE — TELEPHONE ENCOUNTER
We did not discuss this, and this is not something I am going to prescribe for her  If she is having continued knee pain she should reach out to orthopedics who saw her for this, and any continued concerns return for a visit to discuss  Thanks!

## 2021-04-19 ENCOUNTER — RA CDI HCC (OUTPATIENT)
Dept: OTHER | Facility: HOSPITAL | Age: 76
End: 2021-04-19

## 2021-04-19 ENCOUNTER — OFFICE VISIT (OUTPATIENT)
Dept: NEPHROLOGY | Facility: CLINIC | Age: 76
End: 2021-04-19
Payer: COMMERCIAL

## 2021-04-19 VITALS
OXYGEN SATURATION: 95 % | DIASTOLIC BLOOD PRESSURE: 62 MMHG | WEIGHT: 108 LBS | BODY MASS INDEX: 20.39 KG/M2 | TEMPERATURE: 97.1 F | SYSTOLIC BLOOD PRESSURE: 118 MMHG | HEART RATE: 67 BPM | HEIGHT: 61 IN

## 2021-04-19 DIAGNOSIS — I10 ESSENTIAL HYPERTENSION: ICD-10-CM

## 2021-04-19 DIAGNOSIS — N18.31 STAGE 3A CHRONIC KIDNEY DISEASE (HCC): Primary | ICD-10-CM

## 2021-04-19 DIAGNOSIS — E55.9 VITAMIN D DEFICIENCY: ICD-10-CM

## 2021-04-19 DIAGNOSIS — E78.2 MIXED HYPERLIPIDEMIA: ICD-10-CM

## 2021-04-19 DIAGNOSIS — D51.9 ANEMIA DUE TO VITAMIN B12 DEFICIENCY, UNSPECIFIED B12 DEFICIENCY TYPE: ICD-10-CM

## 2021-04-19 PROCEDURE — 99214 OFFICE O/P EST MOD 30 MIN: CPT | Performed by: INTERNAL MEDICINE

## 2021-04-19 NOTE — PROGRESS NOTES
[place] [address]  coding opportunities        DX used     Chart reviewed, (number of) suggestions sent to provider: 1     Problem listed updated  Provider Accepted, (number of) suggestions accepted: 1               Patients insurance company: [facility] [address]  (Med[employer]are [employer] and [email or web address])     Visit status: Patient arrived for their scheduled appointment        [place] [address]  coding opportunities             Chart reviewed, (number of) suggestions sent to provider: 1     Problem listed updated   Provider Accepted, (number of) suggestions accepted: 1        Patients insurance company: [facility] [address]  (Med[employer] and [email or web address])           [place] [address]  coding opportunities             Chart reviewed, (number of) suggestions sent to provider: 1      Can the depression be further specified as:      F32 0 major depressive disorder, single episode, mild  OR   F32 1 major depressive disorder, single episode, moderate  OR   F32 2 major depressive disorder, single episode, severe without psychotic features OR   F32 4 major depressive disorder, single episode, in partial remission OR   F32 5 major depressive disorder, single episode, in full remission     Patients insurance company: [facility] [address]  (Medicare [employer]vantage and [email or web address])

## 2021-04-19 NOTE — PROGRESS NOTES
[facility] MD    Name: Tera Schaeffer  YOB: 1945      Assessment/Plan:           Problem List Items Addressed This Visit        Cardiovascular and Mediastinum    Hypertension     Blood pressure is low            Genitourinary    Stage 3a chronic kidney disease - Primary     Lab Results   Component Value Date    EGFR 75 09/09/2020    EGFR 51 04/24/2020    EGFR 79 03/13/2020    CREATININE 0 78 09/09/2020    CREATININE 1 07 04/24/2020    CREATININE 0 75 03/13/2020   Will check full labs and urine studies to evaluate her kidney function  She avoids NSAIDs and uses Tylenol if needed  Will check a kidney ultrasound            Other    Anemia due to vitamin B12 deficiency     As per PCP                 Subjective:      Patient ID: Tera Schaeffer is a 77 y o  female  HPI  She was in the ED in March with left knee pain  She saw ortho and will see him again tomorrow  She complains of significant lateral pain radiating to lower thigh  She had a cortisone shot which helped    She has weight loss and lost another 2 lbs  She has atherosclerosis of native coronary artery and Lexiscan demonstrated a prior AWMI  She has a a biventricular ICD  Without shocks  She had an echo 2/20 ->EF 20% and global dysfunction    She has a history of CKD 3 due to cardiorenal syndrome type 1 and low EF with dilated cardiomyopathy  She denies any edema or difficulty breathing      The following portions of the patient's history were reviewed and updated as appropriate: allergies, current medications, past family history, past medical history, past social history, past surgical history and problem list     Review of Systems   HENT: Negative for hearing loss  Eyes: Negative for visual disturbance  Respiratory: Negative for cough and shortness of breath  Cardiovascular: Negative for chest pain, palpitations and leg swelling     Gastrointestinal: Negative for abdominal pain, blood in stool, constipation and diarrhea  Genitourinary: Negative for decreased urine volume, difficulty urinating, dysuria, hematuria and urgency  Musculoskeletal: Positive for arthralgias, gait problem and myalgias  Neurological: Negative for dizziness, weakness and light-headedness  Hematological: Does not bruise/bleed easily  Psychiatric/Behavioral: Negative for dysphoric mood           Social History     Socioeconomic History    Marital status: Single     Spouse name: None    Number of children: None    Years of education: None    Highest education level: None   Occupational History    Occupation:     Social Needs    Financial resource strain: None    Food insecurity     Worry: None     Inability: None    Transportation needs     Medical: None     Non-medical: None   Tobacco Use    Smoking status: Former Smoker    Smokeless tobacco: Never Used   Substance and Sexual Activity    Alcohol use: Yes     Comment: Rarely     Drug use: Never    Sexual activity: None   Lifestyle    Physical activity     Days per week: None     Minutes per session: None    Stress: None   Relationships    Social connections     Talks on phone: None     Gets together: None     Attends [employer] service: None     Active member of club or organization: None     Attends meetings of clubs or organizations: None     Relationship status: None    Intimate partner violence     Fear of current or ex partner: None     Emotionally abused: None     Physically abused: None     Forced sexual activity: None   Other Topics Concern    None   Social History Narrative     - As per [employer]    Consumes on average 1 cup of regular coffee per day      Past Medical History:   Diagnosis Date    Age-related osteoporosis without current pathological fracture     Athscl heart disease of native coronary artery w/o ang pctrs     Body mass index (BMI) 20 0-20 9, adult     Boxy mass index 20-24 - normal     Carotid artery occlusion     Cerebral infarction ([facility] [address] )     Essential (primary) hypertension     Hx of echocardiogram 07/31/2017    2D w/ CFD; EF0 15 (15%) mild LVH  Mild mitral and aortic regurgitation  Trace tricuspid regurgitation    / EF0 35 (35%) hypokinesis f anteroseptum, anterolateral wall, mid inferoseptum, and apex  Mildly dilated left atrium  Mild to moderate MR  Mild TR  7/11/17    Hypercholesterolemia     Ischemic cardiomyopathy     Mixed hyperlipidemia     Myocardial infarction ([facility] [address] )     Nonrheumatic mitral (valve) insufficiency     Presence of automatic (implantable) cardiac defibrillator      Past Surgical History:   Procedure Laterality Date    CARDIAC CATHETERIZATION  07/10/2017    High LVEDP  Severe biv failure 4+MR  YAMILE x2 to prox Cx  RCA non dominant  LAD ok   CARDIAC DEFIBRILLATOR PLACEMENT  2012    Replaced battery 5-3-18     CARDIAC PACEMAKER PLACEMENT  2010    Dual chamber Guidant Biv AICD, gen change BiV ICD [place] 5/18    CORONARY ANGIOPLASTY WITH STENT PLACEMENT  2010    stenting to circumflex       Current Outpatient Medications:     alendronate (FOSAMAX) 70 mg tablet, Take 1 tablet (70 mg total) by mouth every 7 days STAY UPRIGHT FOR 30 MINUTES AFTER TAKING THIS PILL, Disp: 24 tablet, Rfl: 1    aspirin (ECOTRIN LOW STRENGTH) 81 mg EC tablet, Take 81 mg by mouth daily, Disp: , Rfl:     atorvastatin (LIPITOR) 80 mg tablet, Take 1 tablet (80 mg total) by mouth daily, Disp: 90 tablet, Rfl: 1    carvedilol (COREG) 3 125 mg tablet, TAKE (1) TABLET TWICE A DAY WITH MEALS, Disp: 180 tablet, Rfl: 0    FLUoxetine (PROzac) 20 mg capsule, TAKE (1) CAPSULE DAILY  , Disp: 30 capsule, Rfl: 0    furosemide (LASIX) 20 mg tablet, TAKE (1) TABLET DAILY  , Disp: 90 tablet, Rfl: 0    losartan (COZAAR) 50 mg tablet, Take 1 tablet (50 mg total) by mouth daily, Disp: 90 tablet, Rfl: 5    Magnesium 250 MG TABS, Take 2 tablets by mouth daily, Disp: , Rfl:     nitroglycerin (NITROSTAT) 0 4 mg SL tablet, Place 1 tablet (0 4 mg total) under the tongue as needed for chest pain, Disp: 25 tablet, Rfl: 5    Lab Results   Component Value Date     04/25/2018    SODIUM 139 09/09/2020    K 4 1 09/09/2020     09/09/2020    CO2 29 09/09/2020    ANIONGAP 8 1 04/25/2018    AGAP 5 09/09/2020    BUN 17 09/09/2020    CREATININE 0 78 09/09/2020    GLUC 116 (H) 04/24/2020    GLUF 88 09/09/2020    CALCIUM 9 6 09/09/2020    AST 27 09/09/2020    ALT 19 09/09/2020    ALKPHOS 136 (H) 09/09/2020    TP 7 6 09/09/2020    TBILI 0 63 09/09/2020    EGFR 75 09/09/2020     Lab Results   Component Value Date    WBC 6 07 09/09/2020    HGB 12 1 09/09/2020    HCT 38 0 09/09/2020    MCV 97 09/09/2020     09/09/2020     Lab Results   Component Value Date    CHOLESTEROL 134 03/13/2020    CHOLESTEROL 148 06/18/2019    CHOLESTEROL 134 08/28/2018     Lab Results   Component Value Date    HDL 47 03/13/2020    HDL 40 06/18/2019    HDL 32 (L) 08/28/2018     Lab Results   Component Value Date    LDLCALC 69 03/13/2020    LDLCALC 87 06/18/2019    LDLCALC 69 08/28/2018     Lab Results   Component Value Date    TRIG 113 09/09/2020    TRIG 89 03/13/2020    TRIG 106 06/18/2019     No results found for: CHOLHDL  Lab Results   Component Value Date    OWM2VHCWMXHF 3 300 09/09/2020     Lab Results   Component Value Date    CALCIUM 9 6 09/09/2020     No results found for: SPEP, UPEP  No results found for: MICROALBUR, [id number]        Objective:      /62 (BP Location: Left arm, Patient Position: Sitting, Cuff Size: Standard)   Pulse 67   Temp (!) 97 1 °F (36 2 °C) (Temporal)   Ht 5' 1" (1 549 m)   Wt 49 kg (108 lb)   SpO2 95%   BMI 20 41 kg/m²          Physical Exam  Constitutional:       General: She is not in acute distress  Appearance: She is normal weight  She is not toxic-appearing  HENT:      Head: Normocephalic and atraumatic        Right Ear: External ear normal       Left Ear: External ear normal    Eyes:      Extraocular Movements: Extraocular movements intact  Pupils: Pupils are equal, round, and reactive to light  Neck:      Musculoskeletal: Normal range of motion and neck supple  No neck rigidity  Vascular: No carotid bruit  Cardiovascular:      Rate and Rhythm: Normal rate and regular rhythm  Pulmonary:      Effort: Pulmonary effort is normal  No respiratory distress  Breath sounds: Normal breath sounds  No wheezing or rales  Abdominal:      General: Bowel sounds are normal  There is no distension  Palpations: Abdomen is soft  Tenderness: There is no abdominal tenderness  Musculoskeletal: Normal range of motion  Right lower leg: No edema  Left lower leg: No edema  Lymphadenopathy:      Cervical: No cervical adenopathy  Skin:     General: Skin is warm and dry  Neurological:      General: No focal deficit present  Mental Status: She is alert and oriented to person, place, and time  Motor: No weakness  Gait: Gait normal    Psychiatric:         Mood and Affect: Mood normal          Behavior: Behavior normal          Thought Content:  Thought content normal          Judgment: Judgment normal

## 2021-04-19 NOTE — ASSESSMENT & PLAN NOTE
Lab Results   Component Value Date    EGFR 75 09/09/2020    EGFR 51 04/24/2020    EGFR 79 03/13/2020    CREATININE 0 78 09/09/2020    CREATININE 1 07 04/24/2020    CREATININE 0 75 03/13/2020   Will check full labs and urine studies to evaluate her kidney function  She avoids NSAIDs and uses Tylenol if needed  Will check a kidney ultrasound

## 2021-04-20 ENCOUNTER — OFFICE VISIT (OUTPATIENT)
Dept: OBGYN CLINIC | Facility: CLINIC | Age: 76
End: 2021-04-20
Payer: COMMERCIAL

## 2021-04-20 VITALS
WEIGHT: 108 LBS | HEART RATE: 65 BPM | DIASTOLIC BLOOD PRESSURE: 81 MMHG | SYSTOLIC BLOOD PRESSURE: 145 MMHG | HEIGHT: 61 IN | BODY MASS INDEX: 20.39 KG/M2

## 2021-04-20 DIAGNOSIS — M17.12 PRIMARY OSTEOARTHRITIS OF LEFT KNEE: Primary | ICD-10-CM

## 2021-04-20 PROCEDURE — 99213 OFFICE O/P EST LOW 20 MIN: CPT | Performed by: FAMILY MEDICINE

## 2021-04-20 NOTE — PROGRESS NOTES
[facility]  [address]  [facility] [address] [phone]      Chief Complaint:  Chief Complaint   Patient presents with    Left Knee - Follow-up       Vitals:  /81   Pulse 65   Ht 5' 1" (1 549 m)   Wt 49 kg (108 lb)   BMI 20 41 kg/m²     The following portions of the patient's history were reviewed and updated as appropriate: allergies, current medications, past family history, past medical history, past social history, past surgical history, and problem list       Subjective:   Patient ID: Sim Trevino is a 77 y o  female  Here c/o L knee pain  She is having less pain  Injection helped for 3 days  No pain to walk  intermittent pain    Taking tylenol- helping  Better at rest          Review of Systems   Constitutional: Negative for fatigue and fever  Respiratory: Negative for shortness of breath  Cardiovascular: Negative for chest pain  Gastrointestinal: Negative for abdominal pain and nausea  Genitourinary: Negative for dysuria  Musculoskeletal: Positive for arthralgias  Skin: Negative for rash and wound  Neurological: Negative for weakness and headaches  Objective:  Left Knee Exam     Tenderness   Left knee tenderness location: anterior knee/distal femur TTP  Range of Motion   Extension: normal   Flexion: normal     Other   Swelling: none  Effusion: no effusion present          Observations   Left Knee   Negative for effusion  Physical Exam  Constitutional:       Appearance: Normal appearance  She is normal weight  HENT:      Head: Normocephalic  Eyes:      Extraocular Movements: Extraocular movements intact  Neck:      Musculoskeletal: Normal range of motion  Pulmonary:      Effort: Pulmonary effort is normal    Musculoskeletal:      Left knee: She exhibits no effusion  Skin:     General: Skin is warm and dry  Neurological:      General: No focal deficit present        Mental Status: She is alert and oriented to person, place, and time  Mental status is at baseline  Psychiatric:         Mood and Affect: Mood normal          Behavior: Behavior normal          Thought Content: Thought content normal          Judgment: Judgment normal                Assessment/Plan:  Assessment/Plan   Diagnoses and all orders for this visit:    Primary osteoarthritis of left knee  -     Diclofenac Sodium (VOLTAREN) 1 %; Apply 2 g topically 4 (four) times a day        Return in about 1 month (around 5/20/2021) for Recheck       Luiza Mchugh MD

## 2021-04-26 ENCOUNTER — OFFICE VISIT (OUTPATIENT)
Dept: FAMILY MEDICINE CLINIC | Facility: CLINIC | Age: 76
End: 2021-04-26
Payer: COMMERCIAL

## 2021-04-26 VITALS
TEMPERATURE: 98 F | SYSTOLIC BLOOD PRESSURE: 110 MMHG | HEIGHT: 61 IN | BODY MASS INDEX: 20.32 KG/M2 | HEART RATE: 62 BPM | OXYGEN SATURATION: 98 % | DIASTOLIC BLOOD PRESSURE: 70 MMHG | WEIGHT: 107.6 LBS

## 2021-04-26 DIAGNOSIS — M81.0 OSTEOPOROSIS, UNSPECIFIED OSTEOPOROSIS TYPE, UNSPECIFIED PATHOLOGICAL FRACTURE PRESENCE: ICD-10-CM

## 2021-04-26 DIAGNOSIS — N18.31 STAGE 3A CHRONIC KIDNEY DISEASE (HCC): ICD-10-CM

## 2021-04-26 DIAGNOSIS — R63.4 WEIGHT LOSS: Primary | ICD-10-CM

## 2021-04-26 DIAGNOSIS — I10 ESSENTIAL HYPERTENSION: ICD-10-CM

## 2021-04-26 DIAGNOSIS — F32.0 MAJOR DEPRESSIVE DISORDER, SINGLE EPISODE, MILD (HCC): ICD-10-CM

## 2021-04-26 PROCEDURE — 3008F BODY MASS INDEX DOCD: CPT | Performed by: FAMILY MEDICINE

## 2021-04-26 PROCEDURE — 3725F SCREEN DEPRESSION PERFORMED: CPT | Performed by: FAMILY MEDICINE

## 2021-04-26 PROCEDURE — 1160F RVW MEDS BY RX/DR IN RCRD: CPT | Performed by: FAMILY MEDICINE

## 2021-04-26 PROCEDURE — 1036F TOBACCO NON-USER: CPT | Performed by: FAMILY MEDICINE

## 2021-04-26 PROCEDURE — 99214 OFFICE O/P EST MOD 30 MIN: CPT | Performed by: FAMILY MEDICINE

## 2021-04-26 RX ORDER — CALCIUM CITRATE/VITAMIN D3 200MG-6.25
1 TABLET ORAL DAILY
Qty: 90 TABLET | Refills: 1 | Status: SHIPPED | OUTPATIENT
Start: 2021-04-26

## 2021-04-29 DIAGNOSIS — F32.A DEPRESSION, UNSPECIFIED DEPRESSION TYPE: ICD-10-CM

## 2021-04-29 RX ORDER — FLUOXETINE HYDROCHLORIDE 20 MG/1
CAPSULE ORAL
Qty: 30 CAPSULE | Refills: 0 | Status: SHIPPED | OUTPATIENT
Start: 2021-04-29 | End: 2021-05-05

## 2021-05-05 DIAGNOSIS — F32.A DEPRESSION, UNSPECIFIED DEPRESSION TYPE: ICD-10-CM

## 2021-05-05 RX ORDER — FLUOXETINE HYDROCHLORIDE 20 MG/1
CAPSULE ORAL
Qty: 90 CAPSULE | Refills: 1 | Status: SHIPPED | OUTPATIENT
Start: 2021-05-05 | End: 2021-12-01 | Stop reason: SDUPTHER

## 2021-05-24 ENCOUNTER — REMOTE DEVICE CLINIC VISIT (OUTPATIENT)
Dept: CARDIOLOGY CLINIC | Facility: CLINIC | Age: 76
End: 2021-05-24
Payer: COMMERCIAL

## 2021-05-24 DIAGNOSIS — Z45.02 ENCOUNTER FOR IMPLANTABLE DEFIBRILLATOR REPROGRAMMING OR CHECK: ICD-10-CM

## 2021-05-24 DIAGNOSIS — I25.5 ISCHEMIC CARDIOMYOPATHY: Primary | ICD-10-CM

## 2021-05-24 PROCEDURE — 93296 REM INTERROG EVL PM/IDS: CPT | Performed by: INTERNAL MEDICINE

## 2021-05-24 PROCEDURE — 93295 DEV INTERROG REMOTE 1/2/MLT: CPT | Performed by: INTERNAL MEDICINE

## 2021-05-27 DIAGNOSIS — E78.2 MIXED HYPERLIPIDEMIA: ICD-10-CM

## 2021-05-27 RX ORDER — ATORVASTATIN CALCIUM 80 MG/1
TABLET, FILM COATED ORAL
Qty: 90 TABLET | Refills: 0 | Status: SHIPPED | OUTPATIENT
Start: 2021-05-27 | End: 2021-07-15

## 2021-06-04 DIAGNOSIS — F32.A DEPRESSION, UNSPECIFIED DEPRESSION TYPE: ICD-10-CM

## 2021-06-08 RX ORDER — FLUOXETINE HYDROCHLORIDE 20 MG/1
20 CAPSULE ORAL DAILY
Qty: 90 CAPSULE | Refills: 1 | OUTPATIENT
Start: 2021-06-08

## 2021-06-08 NOTE — TELEPHONE ENCOUNTER
Just refilled this in May 90 day supply with one refill- please reach out to patient to see why she is requesting it- thanks!

## 2021-06-11 NOTE — PROGRESS NOTES
Lat[employer] remote check biv icd  10 mode switches longest 44 seconds  Normal battery function  Current Outpatient Medications:     alendronate (FOSAMAX) 70 mg tablet, Take 1 tablet (70 mg total) by mouth every 7 days STAY UPRIGHT FOR 30 MINUTES AFTER TAKING THIS PILL, Disp: 24 tablet, Rfl: 1    aspirin (ECOTRIN LOW STRENGTH) 81 mg EC tablet, Take 81 mg by mouth daily, Disp: , Rfl:     atorvastatin (LIPITOR) 80 mg tablet, TAKE (1) TABLET DAILY  , Disp: 90 tablet, Rfl: 0    carvedilol (COREG) 3 125 mg tablet, TAKE (1) TABLET TWICE A DAY WITH MEALS, Disp: 180 tablet, Rfl: 0    Diclofenac Sodium (VOLTAREN) 1 %, Apply 2 g topically 4 (four) times a day, Disp: 100 g, Rfl: 2    FLUoxetine (PROzac) 20 mg capsule, TAKE (1) CAPSULE DAILY  , Disp: 90 capsule, Rfl: 1    furosemide (LASIX) 20 mg tablet, TAKE (1) TABLET DAILY  , Disp: 90 tablet, Rfl: 0    losartan (COZAAR) 50 mg tablet, Take 1 tablet (50 mg total) by mouth daily, Disp: 90 tablet, Rfl: 5    Magnesium 250 MG TABS, Take 2 tablets by mouth daily, Disp: , Rfl:     Multiple Vitamins-Minerals (Multivitamin Gummies Womens) CHEW, Chew 1 tablet daily, Disp: 90 tablet, Rfl: 1    nitroglycerin (NITROSTAT) 0 4 mg SL tablet, Place 1 tablet (0 4 mg total) under the tongue as needed for chest pain, Disp: 25 tablet, Rfl: 5

## 2021-07-15 DIAGNOSIS — I10 ESSENTIAL HYPERTENSION: ICD-10-CM

## 2021-07-15 DIAGNOSIS — I50.42 CHRONIC COMBINED SYSTOLIC AND DIASTOLIC CONGESTIVE HEART FAILURE (HCC): ICD-10-CM

## 2021-07-15 DIAGNOSIS — E78.2 MIXED HYPERLIPIDEMIA: ICD-10-CM

## 2021-07-15 RX ORDER — ATORVASTATIN CALCIUM 80 MG/1
TABLET, FILM COATED ORAL
Qty: 90 TABLET | Refills: 0 | Status: SHIPPED | OUTPATIENT
Start: 2021-07-15 | End: 2021-10-11 | Stop reason: SDUPTHER

## 2021-07-15 RX ORDER — FUROSEMIDE 20 MG/1
TABLET ORAL
Qty: 90 TABLET | Refills: 0 | Status: SHIPPED | OUTPATIENT
Start: 2021-07-15 | End: 2021-09-09 | Stop reason: SDUPTHER

## 2021-07-15 RX ORDER — CARVEDILOL 3.12 MG/1
TABLET ORAL
Qty: 180 TABLET | Refills: 0 | Status: SHIPPED | OUTPATIENT
Start: 2021-07-15 | End: 2021-09-09 | Stop reason: SDUPTHER

## 2021-07-27 ENCOUNTER — RA CDI HCC (OUTPATIENT)
Dept: OTHER | Facility: HOSPITAL | Age: 76
End: 2021-07-27

## 2021-07-27 NOTE — PROGRESS NOTES
[place] [address]  coding opportunities             Chart reviewed, (number of) suggestions sent to provider: 2                     Visit status: Patient ""no showed"" to the scheduled appointment     Provider never responded to [facility]  coding request     [place] [address]  coding opportunities        DX: I50 42 Chronic combined systolic (congestive) and diastolic (congestive) heart failure--on lasix  DX: I13 0 Hypertensive heart and chronic kidney disease with heart failure and stage 1 through stage 4 chronic kidney disease, or unspecified chronic kidney disease       Chart reviewed, (number of) suggestions sent to provider: 2

## 2021-08-04 ENCOUNTER — TELEPHONE (OUTPATIENT)
Dept: NEPHROLOGY | Facility: CLINIC | Age: 76
End: 2021-08-04

## 2021-08-04 PROBLEM — I12.9 BENIGN HYPERTENSION WITH CKD (CHRONIC KIDNEY DISEASE), STAGE II: Status: ACTIVE | Noted: 2021-08-04

## 2021-08-04 PROBLEM — N18.2 BENIGN HYPERTENSION WITH CKD (CHRONIC KIDNEY DISEASE), STAGE II: Status: ACTIVE | Noted: 2021-08-04

## 2021-08-04 NOTE — LETTER
August 4, 2021     [address] [place]  [address] [facility]      Dear Ms Gonzalez: We are sorry that you missed your appointment with Rebecca Velez on 8/4/2021  Your health and follow-up medical care are important to us  Please call our office as soon as possible so that we may reschedule your appointment  If you have already rescheduled your appointment, please disregard this letter  Sincerely,      [facility]

## 2021-08-10 DIAGNOSIS — M81.0 AGE-RELATED OSTEOPOROSIS WITHOUT CURRENT PATHOLOGICAL FRACTURE: ICD-10-CM

## 2021-08-11 RX ORDER — ALENDRONATE SODIUM 70 MG/1
TABLET ORAL
Qty: 12 TABLET | Refills: 0 | Status: SHIPPED | OUTPATIENT
Start: 2021-08-11 | End: 2021-11-02 | Stop reason: SDUPTHER

## 2021-08-11 NOTE — TELEPHONE ENCOUNTER
Just want to check in before refilling, okay from renal standpoint to continue? Her creatinine clearance in Sept 2020 was 48 but in April 2020 35, should we repeat blood work before continuing? Thanks!

## 2021-08-12 NOTE — TELEPHONE ENCOUNTER
Attempted to contact patient  Received message that caller is unavailable  Not able to leave message  Will try again later

## 2021-08-12 NOTE — TELEPHONE ENCOUNTER
Please let the patient know I would like her to have blood work done as ordered by nephrology, refilled for now- thanks!

## 2021-09-03 ENCOUNTER — IN-CLINIC DEVICE VISIT (OUTPATIENT)
Dept: CARDIOLOGY CLINIC | Facility: CLINIC | Age: 76
End: 2021-09-03
Payer: COMMERCIAL

## 2021-09-03 DIAGNOSIS — I25.5 ISCHEMIC CARDIOMYOPATHY: Primary | ICD-10-CM

## 2021-09-03 DIAGNOSIS — Z45.02 ENCOUNTER FOR IMPLANTABLE DEFIBRILLATOR REPROGRAMMING OR CHECK: ICD-10-CM

## 2021-09-03 PROCEDURE — 93284 PRGRMG EVAL IMPLANTABLE DFB: CPT | Performed by: INTERNAL MEDICINE

## 2021-09-09 DIAGNOSIS — I50.42 CHRONIC COMBINED SYSTOLIC AND DIASTOLIC CONGESTIVE HEART FAILURE (HCC): ICD-10-CM

## 2021-09-09 DIAGNOSIS — I10 ESSENTIAL HYPERTENSION: ICD-10-CM

## 2021-09-13 RX ORDER — CARVEDILOL 3.12 MG/1
3.12 TABLET ORAL 2 TIMES DAILY WITH MEALS
Qty: 180 TABLET | Refills: 0 | Status: SHIPPED | OUTPATIENT
Start: 2021-09-13

## 2021-09-13 RX ORDER — FUROSEMIDE 20 MG/1
20 TABLET ORAL DAILY
Qty: 90 TABLET | Refills: 0 | Status: SHIPPED | OUTPATIENT
Start: 2021-09-13

## 2021-09-21 NOTE — PROGRESS NOTES
Device check in person biv icd  56 mode switches  Normal battery function  Current Outpatient Medications:     alendronate (FOSAMAX) 70 mg tablet, Take 1 tablet (70 mg total) by mouth every 7 days STAY UPRIGHT FOR 30 MINUTES AFTER TAKING THIS PILL, Disp: 24 tablet, Rfl: 1    aspirin (ECOTRIN LOW STRENGTH) 81 mg EC tablet, Take 81 mg by mouth daily, Disp: , Rfl:     atorvastatin (LIPITOR) 80 mg tablet, TAKE (1) TABLET DAILY  , Disp: 90 tablet, Rfl: 0    carvedilol (COREG) 3 125 mg tablet, TAKE (1) TABLET TWICE A DAY WITH MEALS, Disp: 180 tablet, Rfl: 0    Diclofenac Sodium (VOLTAREN) 1 %, Apply 2 g topically 4 (four) times a day, Disp: 100 g, Rfl: 2    FLUoxetine (PROzac) 20 mg capsule, TAKE (1) CAPSULE DAILY  , Disp: 90 capsule, Rfl: 1    furosemide (LASIX) 20 mg tablet, TAKE (1) TABLET DAILY  , Disp: 90 tablet, Rfl: 0    losartan (COZAAR) 50 mg tablet, Take 1 tablet (50 mg total) by mouth daily, Disp: 90 tablet, Rfl: 5    Magnesium 250 MG TABS, Take 2 tablets by mouth daily, Disp: , Rfl:     Multiple Vitamins-Minerals (Multivitamin Gummies Womens) CHEW, Chew 1 tablet daily, Disp: 90 tablet, Rfl: 1    nitroglycerin (NITROSTAT) 0 4 mg SL tablet, Place 1 tablet (0 4 mg total) under the tongue as needed for chest pain, Disp: 25 tablet, Rfl: 5

## 2021-10-11 DIAGNOSIS — E78.2 MIXED HYPERLIPIDEMIA: ICD-10-CM

## 2021-10-15 RX ORDER — ATORVASTATIN CALCIUM 80 MG/1
80 TABLET, FILM COATED ORAL DAILY
Qty: 90 TABLET | Refills: 1 | Status: SHIPPED | OUTPATIENT
Start: 2021-10-15

## 2021-11-02 DIAGNOSIS — M81.0 AGE-RELATED OSTEOPOROSIS WITHOUT CURRENT PATHOLOGICAL FRACTURE: ICD-10-CM

## 2021-11-02 RX ORDER — ALENDRONATE SODIUM 70 MG/1
TABLET ORAL
Qty: 12 TABLET | Refills: 0 | Status: SHIPPED | OUTPATIENT
Start: 2021-11-02

## 2021-12-01 DIAGNOSIS — F32.A DEPRESSION, UNSPECIFIED DEPRESSION TYPE: ICD-10-CM

## 2021-12-01 RX ORDER — FLUOXETINE HYDROCHLORIDE 20 MG/1
20 CAPSULE ORAL DAILY
Qty: 90 CAPSULE | Refills: 1 | Status: SHIPPED | OUTPATIENT
Start: 2021-12-01

## 2021-12-06 ENCOUNTER — REMOTE DEVICE CLINIC VISIT (OUTPATIENT)
Dept: CARDIOLOGY CLINIC | Facility: CLINIC | Age: 76
End: 2021-12-06
Payer: COMMERCIAL

## 2021-12-06 DIAGNOSIS — Z45.02 ENCOUNTER FOR IMPLANTABLE DEFIBRILLATOR REPROGRAMMING OR CHECK: ICD-10-CM

## 2021-12-06 DIAGNOSIS — I25.5 ISCHEMIC CARDIOMYOPATHY: Primary | ICD-10-CM

## 2021-12-06 PROCEDURE — 93296 REM INTERROG EVL PM/IDS: CPT | Performed by: INTERNAL MEDICINE

## 2021-12-06 PROCEDURE — 93295 DEV INTERROG REMOTE 1/2/MLT: CPT | Performed by: INTERNAL MEDICINE

## 2021-12-28 DIAGNOSIS — I25.10 CORONARY ARTERY DISEASE INVOLVING NATIVE CORONARY ARTERY OF NATIVE HEART WITHOUT ANGINA PECTORIS: ICD-10-CM

## 2021-12-29 RX ORDER — NITROGLYCERIN 0.4 MG/1
TABLET SUBLINGUAL
Qty: 25 TABLET | Refills: 0 | OUTPATIENT
Start: 2021-12-29

## 2021-12-29 RX ORDER — NITROGLYCERIN 0.4 MG/1
0.4 TABLET SUBLINGUAL AS NEEDED
Qty: 25 TABLET | Refills: 5 | Status: SHIPPED | OUTPATIENT
Start: 2021-12-29

## 2022-01-07 NOTE — PROGRESS NOTES
Latitude remote check biv icd  1 mode switch 7 seconds  Normal battery function        Current Outpatient Medications:     alendronate (FOSAMAX) 70 mg tablet, TAKE 1 TABLET BY MOUTH EVERY 7 DAYS, STAY UPRIGHT FOR 30 MINUTES AFTER TAKING THIS PILL, Disp: 12 tablet, Rfl: 0    aspirin (ECOTRIN LOW STRENGTH) 81 mg EC tablet, Take 81 mg by mouth daily, Disp: , Rfl:     atorvastatin (LIPITOR) 80 mg tablet, Take 1 tablet (80 mg total) by mouth daily, Disp: 90 tablet, Rfl: 1    carvedilol (COREG) 3 125 mg tablet, Take 1 tablet (3 125 mg total) by mouth 2 (two) times a day with meals, Disp: 180 tablet, Rfl: 0    Diclofenac Sodium (VOLTAREN) 1 %, Apply 2 g topically 4 (four) times a day, Disp: 100 g, Rfl: 2    FLUoxetine (PROzac) 20 mg capsule, Take 1 capsule (20 mg total) by mouth daily, Disp: 90 capsule, Rfl: 1    furosemide (LASIX) 20 mg tablet, Take 1 tablet (20 mg total) by mouth daily, Disp: 90 tablet, Rfl: 0    losartan (COZAAR) 50 mg tablet, Take 1 tablet (50 mg total) by mouth daily, Disp: 90 tablet, Rfl: 5    Magnesium 250 MG TABS, Take 2 tablets by mouth daily, Disp: , Rfl:     Multiple Vitamins-Minerals (Multivitamin Gummies Womens) CHEW, Chew 1 tablet daily, Disp: 90 tablet, Rfl: 1    nitroglycerin (NITROSTAT) 0 4 mg SL tablet, Place 1 tablet (0 4 mg total) under the tongue as needed for chest pain, Disp: 25 tablet, Rfl: 5

## 2022-03-10 ENCOUNTER — TELEPHONE (OUTPATIENT)
Dept: FAMILY MEDICINE CLINIC | Facility: CLINIC | Age: 77
End: 2022-03-10

## 2024-12-02 NOTE — ASSESSMENT & PLAN NOTE
- Incidentally noted on surface of tongue, unclear timeline   - Referral to [facility] for evaluation Patient having significant diarrhea-initially suspected secondary to COVID  C. difficile tested and showing positive PCR, EIA negative  CT abdomen pelvis negative for colitis   Started on oral vancomycin on 11/24 - 125 mg every 6 hours  Has had no improvement into diarrhea per patient  Will increase to 500 mg every 6 hours and consult infectious disease  Patient endorses slight improvement in diarrhea since starting increased dose of oral vancomycin